# Patient Record
Sex: FEMALE | Race: BLACK OR AFRICAN AMERICAN | NOT HISPANIC OR LATINO | Employment: UNEMPLOYED | URBAN - METROPOLITAN AREA
[De-identification: names, ages, dates, MRNs, and addresses within clinical notes are randomized per-mention and may not be internally consistent; named-entity substitution may affect disease eponyms.]

---

## 2021-10-20 ENCOUNTER — OFFICE VISIT (OUTPATIENT)
Dept: INTERNAL MEDICINE CLINIC | Facility: CLINIC | Age: 31
End: 2021-10-20
Payer: COMMERCIAL

## 2021-10-20 VITALS
BODY MASS INDEX: 46.52 KG/M2 | OXYGEN SATURATION: 99 % | HEIGHT: 55 IN | HEART RATE: 91 BPM | TEMPERATURE: 98.9 F | SYSTOLIC BLOOD PRESSURE: 90 MMHG | WEIGHT: 201 LBS | DIASTOLIC BLOOD PRESSURE: 68 MMHG

## 2021-10-20 DIAGNOSIS — E34.3 DWARFISM: ICD-10-CM

## 2021-10-20 DIAGNOSIS — Z00.00 ANNUAL PHYSICAL EXAM: Primary | ICD-10-CM

## 2021-10-20 DIAGNOSIS — E66.01 MORBID OBESITY WITH BMI OF 50.0-59.9, ADULT (HCC): ICD-10-CM

## 2021-10-20 DIAGNOSIS — E11.9 TYPE 2 DIABETES MELLITUS WITHOUT COMPLICATION, WITHOUT LONG-TERM CURRENT USE OF INSULIN (HCC): ICD-10-CM

## 2021-10-20 DIAGNOSIS — J45.30 MILD PERSISTENT ASTHMA WITHOUT COMPLICATION: ICD-10-CM

## 2021-10-20 PROBLEM — E34.328 DWARFISM: Status: ACTIVE | Noted: 2021-10-20

## 2021-10-20 PROCEDURE — 99385 PREV VISIT NEW AGE 18-39: CPT | Performed by: FAMILY MEDICINE

## 2021-10-20 PROCEDURE — 3725F SCREEN DEPRESSION PERFORMED: CPT | Performed by: FAMILY MEDICINE

## 2021-10-20 PROCEDURE — 1036F TOBACCO NON-USER: CPT | Performed by: FAMILY MEDICINE

## 2021-10-20 PROCEDURE — 3008F BODY MASS INDEX DOCD: CPT | Performed by: FAMILY MEDICINE

## 2021-10-20 RX ORDER — LANCETS 30 GAUGE
EACH MISCELLANEOUS
COMMUNITY
Start: 2021-10-13

## 2021-10-20 RX ORDER — ERGOCALCIFEROL 1.25 MG/1
CAPSULE ORAL
COMMUNITY
Start: 2021-10-13

## 2021-10-20 RX ORDER — SEMAGLUTIDE 1.34 MG/ML
INJECTION, SOLUTION SUBCUTANEOUS
COMMUNITY
Start: 2021-10-13

## 2021-10-20 RX ORDER — BLOOD SUGAR DIAGNOSTIC
STRIP MISCELLANEOUS
COMMUNITY
Start: 2021-10-13

## 2021-10-20 RX ORDER — PSEUDOEPHEDRINE HCL 30 MG
100 TABLET ORAL 2 TIMES DAILY
COMMUNITY
Start: 2021-04-12 | End: 2022-04-12

## 2021-10-20 RX ORDER — BUPROPION HYDROCHLORIDE 150 MG/1
150 TABLET ORAL
COMMUNITY
Start: 2021-01-18 | End: 2022-01-18

## 2022-01-11 ENCOUNTER — TELEPHONE (OUTPATIENT)
Dept: INTERNAL MEDICINE CLINIC | Facility: CLINIC | Age: 32
End: 2022-01-11

## 2022-01-11 NOTE — TELEPHONE ENCOUNTER
Patient states her employer is requiring her to provide proof of being negative for COVID before returning to work  Patient was tested on 12/27/21 and states she received a positive result from [facility]  Patient wants to know if she would be able to get a letter stating that she can return to work because of CDC guidelines of being quarantined for 10 days       Advise patient [phone]

## 2022-03-09 ENCOUNTER — TELEPHONE (OUTPATIENT)
Dept: INTERNAL MEDICINE CLINIC | Facility: CLINIC | Age: 32
End: 2022-03-09

## 2023-07-19 ENCOUNTER — HOSPITAL ENCOUNTER (EMERGENCY)
Facility: HOSPITAL | Age: 33
Discharge: HOME | End: 2023-07-20
Attending: EMERGENCY MEDICINE
Payer: COMMERCIAL

## 2023-07-19 ENCOUNTER — APPOINTMENT (EMERGENCY)
Dept: CT IMAGING | Facility: HOSPITAL | Age: 33
End: 2023-07-19
Payer: COMMERCIAL

## 2023-07-19 DIAGNOSIS — E11.65 HYPERGLYCEMIA DUE TO DIABETES MELLITUS (HCC): ICD-10-CM

## 2023-07-19 DIAGNOSIS — S52.609A DISTAL END OF ULNA FRACTURE, CLOSED: ICD-10-CM

## 2023-07-19 DIAGNOSIS — S00.83XA CONTUSION OF FACE: ICD-10-CM

## 2023-07-19 DIAGNOSIS — S09.93XA FACIAL TRAUMA: ICD-10-CM
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
## 2023-07-19 PROCEDURE — 70486 CT MAXILLOFACIAL W/O DYE: CPT

## 2023-07-19 PROCEDURE — 71260 CT THORAX DX C+: CPT

## 2023-07-19 PROCEDURE — 70450 CT HEAD/BRAIN W/O DYE: CPT

## 2023-07-19 PROCEDURE — 99285 EMERGENCY DEPT VISIT HI MDM: CPT | Performed by: EMERGENCY MEDICINE

## 2023-07-19 PROCEDURE — 90471 IMMUNIZATION ADMIN: CPT

## 2023-07-19 PROCEDURE — 72125 CT NECK SPINE W/O DYE: CPT

## 2023-07-19 PROCEDURE — 74177 CT ABD & PELVIS W/CONTRAST: CPT

## 2023-07-20 ENCOUNTER — APPOINTMENT (EMERGENCY)
Dept: RADIOLOGY | Facility: HOSPITAL | Age: 33
End: 2023-07-20
Payer: COMMERCIAL

## 2023-07-20 VITALS
HEART RATE: 101 BPM | BODY MASS INDEX: 38.83 KG/M2 | RESPIRATION RATE: 15 BRPM | OXYGEN SATURATION: 100 % | SYSTOLIC BLOOD PRESSURE: 118 MMHG | WEIGHT: 180 LBS | DIASTOLIC BLOOD PRESSURE: 60 MMHG | HEIGHT: 57 IN | TEMPERATURE: 98.4 F

## 2023-07-20 LAB
ALBUMIN SERPL BCP-MCNC: 4.1 G/DL (ref 3.5–5)
ALP SERPL-CCNC: 104 U/L (ref 34–104)
ALT SERPL W P-5'-P-CCNC: 14 U/L (ref 7–52)
ANION GAP SERPL CALCULATED.3IONS-SCNC: 10 MMOL/L
AST SERPL W P-5'-P-CCNC: 15 U/L (ref 13–39)
ATRIAL RATE: 109 BPM
BASOPHILS # BLD AUTO: 0.04 THOUSANDS/ÂΜL (ref 0–0.1)
BASOPHILS NFR BLD AUTO: 0 % (ref 0–1)
BILIRUB SERPL-MCNC: 0.8 MG/DL (ref 0.2–1)
BUN SERPL-MCNC: 16 MG/DL (ref 5–25)
CALCIUM SERPL-MCNC: 9.4 MG/DL (ref 8.4–10.2)
CARDIAC TROPONIN I PNL SERPL HS: <2 NG/L
CHLORIDE SERPL-SCNC: 97 MMOL/L (ref 96–108)
CO2 SERPL-SCNC: 23 MMOL/L (ref 21–32)
CREAT SERPL-MCNC: 0.78 MG/DL (ref 0.6–1.3)
EOSINOPHIL # BLD AUTO: 0 THOUSAND/ÂΜL (ref 0–0.61)
EOSINOPHIL NFR BLD AUTO: 0 % (ref 0–6)
ERYTHROCYTE [DISTWIDTH] IN BLOOD BY AUTOMATED COUNT: 15 % (ref 11.6–15.1)
GFR SERPL CREATININE-BSD FRML MDRD: 99 ML/MIN/1.73SQ M
GLUCOSE SERPL-MCNC: 333 MG/DL (ref 65–140)
HCG SERPL QL: NEGATIVE
HCT VFR BLD AUTO: 42.7 % (ref 34.8–46.1)
HGB BLD-MCNC: 13.6 G/DL (ref 11.5–15.4)
IMM GRANULOCYTES # BLD AUTO: 0.04 THOUSAND/UL (ref 0–0.2)
IMM GRANULOCYTES NFR BLD AUTO: 0 % (ref 0–2)
LYMPHOCYTES # BLD AUTO: 2.15 THOUSANDS/ÂΜL (ref 0.6–4.47)
LYMPHOCYTES NFR BLD AUTO: 13 % (ref 14–44)
MCH RBC QN AUTO: 22.4 PG (ref 26.8–34.3)
MCHC RBC AUTO-ENTMCNC: 31.9 G/DL (ref 31.4–37.4)
MCV RBC AUTO: 70 FL (ref 82–98)
MONOCYTES # BLD AUTO: 0.83 THOUSAND/ÂΜL (ref 0.17–1.22)
MONOCYTES NFR BLD AUTO: 5 % (ref 4–12)
NEUTROPHILS # BLD AUTO: 13.34 THOUSANDS/ÂΜL (ref 1.85–7.62)
NEUTS SEG NFR BLD AUTO: 82 % (ref 43–75)
NRBC BLD AUTO-RTO: 0 /100 WBCS
P AXIS: 80 DEGREES
PLATELET # BLD AUTO: 386 THOUSANDS/UL (ref 149–390)
PMV BLD AUTO: 10.3 FL (ref 8.9–12.7)
POTASSIUM SERPL-SCNC: 3.9 MMOL/L (ref 3.5–5.3)
PR INTERVAL: 152 MS
PROT SERPL-MCNC: 7.6 G/DL (ref 6.4–8.4)
QRS AXIS: 82 DEGREES
QRSD INTERVAL: 70 MS
QT INTERVAL: 332 MS
QTC INTERVAL: 447 MS
RBC # BLD AUTO: 6.07 MILLION/UL (ref 3.81–5.12)
SODIUM SERPL-SCNC: 130 MMOL/L (ref 135–147)
T WAVE AXIS: 31 DEGREES
VENTRICULAR RATE: 109 BPM
WBC # BLD AUTO: 16.4 THOUSAND/UL (ref 4.31–10.16)

## 2023-07-20 PROCEDURE — 80053 COMPREHEN METABOLIC PANEL: CPT | Performed by: EMERGENCY MEDICINE

## 2023-07-20 PROCEDURE — 93010 ELECTROCARDIOGRAM REPORT: CPT | Performed by: INTERNAL MEDICINE

## 2023-07-20 PROCEDURE — 73090 X-RAY EXAM OF FOREARM: CPT

## 2023-07-20 PROCEDURE — 85025 COMPLETE CBC W/AUTO DIFF WBC: CPT | Performed by: EMERGENCY MEDICINE

## 2023-07-20 PROCEDURE — 73110 X-RAY EXAM OF WRIST: CPT

## 2023-07-20 PROCEDURE — 36415 COLL VENOUS BLD VENIPUNCTURE: CPT | Performed by: EMERGENCY MEDICINE

## 2023-07-20 PROCEDURE — 84484 ASSAY OF TROPONIN QUANT: CPT | Performed by: EMERGENCY MEDICINE

## 2023-07-20 PROCEDURE — 84703 CHORIONIC GONADOTROPIN ASSAY: CPT | Performed by: EMERGENCY MEDICINE

## 2023-07-20 PROCEDURE — 93005 ELECTROCARDIOGRAM TRACING: CPT

## 2023-07-20 PROCEDURE — 73564 X-RAY EXAM KNEE 4 OR MORE: CPT

## 2023-07-20 PROCEDURE — 90715 TDAP VACCINE 7 YRS/> IM: CPT | Performed by: EMERGENCY MEDICINE

## 2023-07-20 RX ORDER — GINSENG 100 MG
1 CAPSULE ORAL ONCE
Status: COMPLETED | OUTPATIENT
Start: 2023-07-20 | End: 2023-07-20

## 2023-07-20 RX ORDER — IBUPROFEN 400 MG/1
400 TABLET ORAL ONCE
Status: COMPLETED | OUTPATIENT
Start: 2023-07-20 | End: 2023-07-20

## 2023-07-20 RX ORDER — ACETAMINOPHEN 325 MG/1
650 TABLET ORAL ONCE
Status: COMPLETED | OUTPATIENT
Start: 2023-07-20 | End: 2023-07-20

## 2023-07-20 RX ADMIN — IOHEXOL 100 ML: 350 INJECTION, SOLUTION INTRAVENOUS at 01:15

## 2023-07-20 RX ADMIN — IBUPROFEN 400 MG: 400 TABLET, FILM COATED ORAL at 03:46

## 2023-07-20 RX ADMIN — BACITRACIN ZINC 1 LARGE APPLICATION: 500 OINTMENT TOPICAL at 03:46

## 2023-07-20 RX ADMIN — ACETAMINOPHEN 650 MG: 325 TABLET, FILM COATED ORAL at 03:46

## 2023-07-20 RX ADMIN — TETANUS TOXOID, REDUCED DIPHTHERIA TOXOID AND ACELLULAR PERTUSSIS VACCINE, ADSORBED 0.5 ML: 5; 2.5; 8; 8; 2.5 SUSPENSION INTRAMUSCULAR at 03:46
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
## 2023-07-23 DIAGNOSIS — M25.531 PAIN IN RIGHT WRIST: Primary | ICD-10-CM

## 2023-07-25 ENCOUNTER — APPOINTMENT (OUTPATIENT)
Dept: RADIOLOGY | Facility: CLINIC | Age: 33
End: 2023-07-25
Payer: COMMERCIAL

## 2023-07-25 ENCOUNTER — OFFICE VISIT (OUTPATIENT)
Dept: OBGYN CLINIC | Facility: CLINIC | Age: 33
End: 2023-07-25
Payer: COMMERCIAL

## 2023-07-25 VITALS
HEIGHT: 57 IN | DIASTOLIC BLOOD PRESSURE: 82 MMHG | BODY MASS INDEX: 38.83 KG/M2 | WEIGHT: 180 LBS | SYSTOLIC BLOOD PRESSURE: 122 MMHG | HEART RATE: 105 BPM

## 2023-07-25 DIAGNOSIS — S52.691A OTHER CLOSED FRACTURE OF DISTAL END OF RIGHT ULNA, INITIAL ENCOUNTER: Primary | ICD-10-CM

## 2023-07-25 DIAGNOSIS — M25.531 PAIN IN RIGHT WRIST: ICD-10-CM

## 2023-07-25 PROBLEM — S52.601A CLOSED FRACTURE OF LOWER END OF RIGHT ULNA: Status: ACTIVE | Noted: 2023-07-25

## 2023-07-25 PROCEDURE — 73110 X-RAY EXAM OF WRIST: CPT

## 2023-07-25 PROCEDURE — 25530 CLTX ULNAR SHFT FX W/O MNPJ: CPT | Performed by: ORTHOPAEDIC SURGERY

## 2023-07-25 PROCEDURE — 99203 OFFICE O/P NEW LOW 30 MIN: CPT | Performed by: ORTHOPAEDIC SURGERY

## 2023-07-25 NOTE — LETTER
July 25, 2023     Patient: Mitch Penaloza  YOB: 1990  Date of Visit: 7/25/2023      To Whom it May Concern:    Mitch Penaloza is under my professional care. Gudelia Stiles was seen in my office on 7/25/2023. Gudelia Stiles may return to work with limitation, no lifting greater then 5 lbs with right upper extremity. Cast must remain clean, dry and intact. She will be re-evaluated in 4 weeks time. If you have any questions or concerns, please don't hesitate to call.          Sincerely,          Derek Guerra MD
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
## 2023-08-19 DIAGNOSIS — S52.691A OTHER CLOSED FRACTURE OF DISTAL END OF RIGHT ULNA, INITIAL ENCOUNTER: Primary | ICD-10-CM

## 2023-08-22 ENCOUNTER — APPOINTMENT (OUTPATIENT)
Dept: RADIOLOGY | Facility: CLINIC | Age: 33
End: 2023-08-22
Payer: COMMERCIAL

## 2023-08-22 ENCOUNTER — OFFICE VISIT (OUTPATIENT)
Dept: OBGYN CLINIC | Facility: CLINIC | Age: 33
End: 2023-08-22

## 2023-08-22 DIAGNOSIS — S52.691D OTHER CLOSED FRACTURE OF DISTAL END OF RIGHT ULNA WITH ROUTINE HEALING, SUBSEQUENT ENCOUNTER: Primary | ICD-10-CM

## 2023-08-22 DIAGNOSIS — S52.691A OTHER CLOSED FRACTURE OF DISTAL END OF RIGHT ULNA, INITIAL ENCOUNTER: ICD-10-CM

## 2023-08-22 PROCEDURE — 73110 X-RAY EXAM OF WRIST: CPT

## 2023-08-22 PROCEDURE — 99024 POSTOP FOLLOW-UP VISIT: CPT | Performed by: ORTHOPAEDIC SURGERY

## 2023-08-22 NOTE — PROGRESS NOTES
Orthopaedics Office Visit - Established Patient Visit    ASSESSMENT/PLAN:    Assessment:   Right distal ulna fracture, DOI 7/19/23   Significant callus, minimal pain to palpation over the fracture site, mild stiffness/soreness however    Plan:   · The patient's x-rays were reviewed today. · The patient's cast was removed today. · The patient was provided a cock-up wrist splint to assist with transition out of cast.  · She was provided work restrictions including no lifting greater than 10 lbs. A note was provided. · The patient was referred to occupational therapy. · Follow-up in 3 weeks for re-evaluation     To Do Next Visit:  Re-evaluation     _____________________________________________________  CHIEF COMPLAINT:  Chief Complaint   Patient presents with   • Right Wrist - Follow-up         SUBJECTIVE:  Soco Hargrove is a 28 y.o. female who presents approximately 5 weeks status post Right distal ulna fracture sustained on 7/19/23. She has maintained her cast. She notes there is pain following cast removal at the midshaft of the ulna. She has been doing curbside deliveries with limited hours. PAST MEDICAL HISTORY:  Past Medical History:   Diagnosis Date   • Diabetes mellitus ([address])        PAST SURGICAL HISTORY:  History reviewed. No pertinent surgical history. FAMILY HISTORY:  History reviewed. No pertinent family history. SOCIAL HISTORY:  Social History     Tobacco Use   • Smoking status: Never   • Smokeless tobacco: Never   Vaping Use   • Vaping Use: Never used   Substance Use Topics   • Alcohol use: Never   • Drug use: Never       MEDICATIONS:    Current Outpatient Medications:   •  ergocalciferol (VITAMIN D2) 50,000 units, , Disp: , Rfl:   •  fluticasone-salmeterol (Advair) 250-50 mcg/dose inhaler, , Disp: , Rfl:   •  glucose blood test strip, To be used for blood sugar checks bid.  Dx E11.65, Disp: , Rfl:   •  Lancets (OneTouch Delica Plus [id number]) MISC, , Disp: , Rfl:   •  Jeannie Mate Lancets 30G MISC, To be used for blood sugar checks bid. Dx E11.65, Disp: , Rfl:   •  OneTouch Verio test strip, , Disp: , Rfl:   •  Ozempic, 0.25 or 0.5 MG/DOSE, 2 MG/1.5ML SOPN, , Disp: , Rfl:   •  buPROPion (WELLBUTRIN XL) 150 mg 24 hr tablet, Take 150 mg by mouth, Disp: , Rfl:   •  Docusate Sodium (DSS) 100 MG CAPS, Take 100 mg by mouth 2 (two) times a day, Disp: , Rfl:   •  hydrocortisone 2.5 % cream, Apply topically 2 (two) times a day, Disp: , Rfl:   •  metFORMIN (GLUCOPHAGE) 1000 MG tablet, Take 1,000 mg by mouth, Disp: , Rfl:     ALLERGIES:  No Known Allergies    REVIEW OF SYSTEMS:  MSK: right wrist pain  Neuro: WNL  Pertinent items are otherwise noted in HPI. A comprehensive review of systems was otherwise negative. LABS:  HgA1c:   Lab Results   Component Value Date    HGBA1C 14.4 (H) 04/11/2023     BMP:   Lab Results   Component Value Date    GLUCOSE 95 01/04/2014    CALCIUM 9.4 07/20/2023     (L) 01/04/2014    K 3.9 07/20/2023    CO2 23 07/20/2023    CL 97 07/20/2023    BUN 16 07/20/2023    CREATININE 0.78 07/20/2023     CBC: No components found for: "CBC"    _____________________________________________________  PHYSICAL EXAMINATION:  Vital signs: There were no vitals taken for this visit. General: No acute distress, awake and alert  Psychiatric: Mood and affect appear appropriate  HEENT: Trachea Midline, No torticollis, no apparent facial trauma  Cardiovascular: No audible murmurs;  Extremities appear perfused  Pulmonary: No audible wheezing or stridor  Skin: No open lesions; see further details (if any) below    MUSCULOSKELETAL EXAMINATION:  Extremities:      Right Wrist  Active range of motion maintained  Tenderness to palpation over midshaft of ulna   strength 5/5  Normal motion of fingers  Swelling noted over midshaft of ulna  No ecchymosis   Fingers are pink and mobile  Compartments are soft  Brisk capillary refill  Sensation is intact   The patient is neurovascularly intact distally in the extremity. _____________________________________________________  STUDIES REVIEWED:  I personally reviewed the images and interpretation is as follows:    X-rays taken 8/22/23 of right wrist demonstrate maintenance of alignment of mid shaft of ulna fracture with interval healing and callus formation. No acute fracture. PROCEDURES PERFORMED:  Procedures   None performed.        Scribe Attestation    I,:  Adrian Coreas am acting as a scribe while in the presence of the attending physician.:       I,:  Nora Elizondo MD personally performed the services described in this documentation    as scribed in my presence.:

## 2023-08-28 ENCOUNTER — TELEPHONE (OUTPATIENT)
Age: 33
End: 2023-08-28

## 2023-08-28 NOTE — TELEPHONE ENCOUNTER
Scheduled appointment for patient with Dr. Bulmaro Ray for Thursday, 09/07/23 at 10.10 am. Patient will do IRIS eye exam at the times of the appointment.

## 2023-09-07 ENCOUNTER — OFFICE VISIT (OUTPATIENT)
Age: 33
End: 2023-09-07
Payer: COMMERCIAL

## 2023-09-07 ENCOUNTER — TELEPHONE (OUTPATIENT)
Age: 33
End: 2023-09-07

## 2023-09-07 VITALS
HEART RATE: 90 BPM | OXYGEN SATURATION: 98 % | BODY MASS INDEX: 38.95 KG/M2 | TEMPERATURE: 97.8 F | RESPIRATION RATE: 18 BRPM | WEIGHT: 180 LBS | DIASTOLIC BLOOD PRESSURE: 80 MMHG | SYSTOLIC BLOOD PRESSURE: 100 MMHG

## 2023-09-07 DIAGNOSIS — F32.2 SEVERE MAJOR DEPRESSION, SINGLE EPISODE (HCC): ICD-10-CM

## 2023-09-07 DIAGNOSIS — J45.30 MILD PERSISTENT ASTHMA WITHOUT COMPLICATION: ICD-10-CM

## 2023-09-07 DIAGNOSIS — Z11.59 NEED FOR HEPATITIS C SCREENING TEST: ICD-10-CM

## 2023-09-07 DIAGNOSIS — Z59.01 LIVING IN SHELTER: ICD-10-CM

## 2023-09-07 DIAGNOSIS — E11.9 TYPE 2 DIABETES MELLITUS WITHOUT COMPLICATION, WITHOUT LONG-TERM CURRENT USE OF INSULIN (HCC): ICD-10-CM

## 2023-09-07 DIAGNOSIS — Z00.00 ANNUAL PHYSICAL EXAM: Primary | ICD-10-CM

## 2023-09-07 DIAGNOSIS — Z87.898 HISTORY OF DOMESTIC VIOLENCE: ICD-10-CM

## 2023-09-07 DIAGNOSIS — Z11.4 SCREENING FOR HIV (HUMAN IMMUNODEFICIENCY VIRUS): ICD-10-CM

## 2023-09-07 PROBLEM — E66.01 MORBID OBESITY WITH BMI OF 50.0-59.9, ADULT (HCC): Status: RESOLVED | Noted: 2021-10-20 | Resolved: 2023-09-07

## 2023-09-07 LAB — SL AMB POCT HEMOGLOBIN AIC: 12.6 (ref ?–6.5)

## 2023-09-07 PROCEDURE — 83036 HEMOGLOBIN GLYCOSYLATED A1C: CPT | Performed by: FAMILY MEDICINE

## 2023-09-07 PROCEDURE — 92250 FUNDUS PHOTOGRAPHY W/I&R: CPT | Performed by: FAMILY MEDICINE

## 2023-09-07 PROCEDURE — 99214 OFFICE O/P EST MOD 30 MIN: CPT | Performed by: FAMILY MEDICINE

## 2023-09-07 PROCEDURE — 99395 PREV VISIT EST AGE 18-39: CPT | Performed by: FAMILY MEDICINE

## 2023-09-07 RX ORDER — LANCETS 30 GAUGE
EACH MISCELLANEOUS
Qty: 100 EACH | Refills: 2 | Status: SHIPPED | OUTPATIENT
Start: 2023-09-07

## 2023-09-07 RX ORDER — SEMAGLUTIDE 1.34 MG/ML
0.5 INJECTION, SOLUTION SUBCUTANEOUS
Qty: 1.52 ML | Refills: 0 | Status: SHIPPED | OUTPATIENT
Start: 2023-09-07 | End: 2023-10-07

## 2023-09-07 RX ORDER — ALBUTEROL SULFATE 90 UG/1
2 AEROSOL, METERED RESPIRATORY (INHALATION) EVERY 6 HOURS PRN
Qty: 18 G | Refills: 5 | Status: SHIPPED | OUTPATIENT
Start: 2023-09-07

## 2023-09-07 RX ORDER — FLUTICASONE PROPIONATE AND SALMETEROL 250; 50 UG/1; UG/1
1 POWDER RESPIRATORY (INHALATION) 2 TIMES DAILY
Qty: 60 BLISTER | Refills: 5 | Status: SHIPPED | OUTPATIENT
Start: 2023-09-07

## 2023-09-07 RX ORDER — LANCETS 30 GAUGE
EACH MISCELLANEOUS
Qty: 100 EACH | Refills: 0 | Status: SHIPPED | OUTPATIENT
Start: 2023-09-07

## 2023-09-07 SDOH — ECONOMIC STABILITY - HOUSING INSECURITY: SHELTERED HOMELESSNESS: Z59.01
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
## 2023-09-07 NOTE — TELEPHONE ENCOUNTER
Scheduled pt for 4 wk f/u with Dr Goldie Devi on: 10/9 at [facility]- long visit- under [id number] [id number]    Maybe it should've been a short visit; since it's a f/u    Let me know if I have to change it- I can call the pt tolerated

## 2023-09-08 ENCOUNTER — TELEPHONE (OUTPATIENT)
Age: 33
End: 2023-09-08

## 2023-09-08 DIAGNOSIS — S52.691D OTHER CLOSED FRACTURE OF DISTAL END OF RIGHT ULNA WITH ROUTINE HEALING, SUBSEQUENT ENCOUNTER: Primary | ICD-10-CM

## 2023-09-08 LAB
CREAT ?TM UR-SCNC: 101.9 UMOL/L
EXT ALBUMIN URINE RANDOM: 2.1
EXTERNAL HIV SCREEN: NORMAL
HCV AB SER-ACNC: NORMAL
LEFT EYE DIABETIC RETINOPATHY: NORMAL
LEFT EYE IMAGE QUALITY: NORMAL
LEFT EYE MACULAR EDEMA: NORMAL
LEFT EYE OTHER RETINOPATHY: NORMAL
MICROALBUMIN/CREAT UR: 20.6 MG/G{CREAT}
RIGHT EYE DIABETIC RETINOPATHY: NORMAL
RIGHT EYE IMAGE QUALITY: NORMAL
RIGHT EYE MACULAR EDEMA: NORMAL
RIGHT EYE OTHER RETINOPATHY: NORMAL
SEVERITY (EYE EXAM): NORMAL

## 2023-09-08 NOTE — TELEPHONE ENCOUNTER
----- Message from Carmelo Wagoner DO sent at 9/8/2023  9:12 AM EDT -----  Normal diabetes eye screening

## 2023-09-12 ENCOUNTER — TELEPHONE (OUTPATIENT)
Age: 33
End: 2023-09-12

## 2023-09-12 ENCOUNTER — OFFICE VISIT (OUTPATIENT)
Dept: OBGYN CLINIC | Facility: CLINIC | Age: 33
End: 2023-09-12

## 2023-09-12 ENCOUNTER — APPOINTMENT (OUTPATIENT)
Dept: RADIOLOGY | Facility: CLINIC | Age: 33
End: 2023-09-12
Payer: COMMERCIAL

## 2023-09-12 VITALS
SYSTOLIC BLOOD PRESSURE: 125 MMHG | BODY MASS INDEX: 38.83 KG/M2 | HEIGHT: 57 IN | HEART RATE: 72 BPM | DIASTOLIC BLOOD PRESSURE: 85 MMHG | WEIGHT: 180 LBS

## 2023-09-12 DIAGNOSIS — S52.691D OTHER CLOSED FRACTURE OF DISTAL END OF RIGHT ULNA WITH ROUTINE HEALING, SUBSEQUENT ENCOUNTER: Primary | ICD-10-CM

## 2023-09-12 DIAGNOSIS — S52.691D OTHER CLOSED FRACTURE OF DISTAL END OF RIGHT ULNA WITH ROUTINE HEALING, SUBSEQUENT ENCOUNTER: ICD-10-CM

## 2023-09-12 PROCEDURE — 73110 X-RAY EXAM OF WRIST: CPT

## 2023-09-12 PROCEDURE — 99024 POSTOP FOLLOW-UP VISIT: CPT | Performed by: ORTHOPAEDIC SURGERY

## 2023-09-12 RX ORDER — NAPROXEN 500 MG/1
500 TABLET ORAL 2 TIMES DAILY WITH MEALS
Qty: 30 TABLET | Refills: 0 | Status: SHIPPED | OUTPATIENT
Start: 2023-09-12

## 2023-09-12 NOTE — PATIENT INSTRUCTIONS
- Weight bearing as tolerated Right upper extremity   - Continue to recommend occupational therapy for the right upper extremity   - Over the counter analgesics as needed / directed   - Ice / heat as directed   - Follow up 4 weeks with repeat XR

## 2023-09-12 NOTE — TELEPHONE ENCOUNTER
----- Message from Juni Kaplan DO sent at 9/12/2023  3:58 PM EDT -----  HIV, hepatitis C and urine study was normal

## 2023-09-12 NOTE — PROGRESS NOTES
Orthopaedics Office Visit - Follow up Patient Visit    ASSESSMENT/PLAN:    Assessment:   ***    Plan:   ***    To Do Next Visit:  ***    _____________________________________________________  CHIEF COMPLAINT:  Chief Complaint   Patient presents with   • Right Wrist - Follow-up         SUBJECTIVE:  Humberto Lamb is a 28 y.o. female who presents ***     PAST MEDICAL HISTORY:  Past Medical History:   Diagnosis Date   • Diabetes mellitus ([address])        PAST SURGICAL HISTORY:  History reviewed. No pertinent surgical history. FAMILY HISTORY:  History reviewed. No pertinent family history.     SOCIAL HISTORY:  Social History     Tobacco Use   • Smoking status: Never   • Smokeless tobacco: Never   Vaping Use   • Vaping Use: Never used   Substance Use Topics   • Alcohol use: Never   • Drug use: Never       MEDICATIONS:    Current Outpatient Medications:   •  albuterol (Ventolin HFA) 90 mcg/act inhaler, Inhale 2 puffs every 6 (six) hours as needed for wheezing, Disp: 18 g, Rfl: 5  •  Fluticasone-Salmeterol (Advair Diskus) 250-50 mcg/dose inhaler, Inhale 1 puff 2 (two) times a day, Disp: 60 blister, Rfl: 5  •  glucose blood test strip, Use 1 each 2 (two) times a day Use as instructed, Disp: 100 strip, Rfl: 2  •  Lancets (OneTouch Delica Plus [id number]) MISC, Use to check blood sugar 3 times a day, Disp: 100 each, Rfl: 0  •  metFORMIN (GLUCOPHAGE) 1000 MG tablet, Take 1 tablet (1,000 mg total) by mouth 2 (two) times a day with meals, Disp: 180 tablet, Rfl: 1  •  OneTouch Delica Lancets 76V MISC, Use to check blood sugar 3 times a day, Disp: 100 each, Rfl: 2  •  Ozempic, 0.25 or 0.5 MG/DOSE, 2 MG/1.5ML injection pen, Inject 0.38 mL (0.5 mg total) under the skin every 7 days, Disp: 1.52 mL, Rfl: 0  •  buPROPion (WELLBUTRIN XL) 150 mg 24 hr tablet, Take 150 mg by mouth, Disp: , Rfl:   •  Docusate Sodium (DSS) 100 MG CAPS, Take 100 mg by mouth 2 (two) times a day, Disp: , Rfl:   •  ergocalciferol (VITAMIN D2) 50,000 units, , Disp: , Rfl:   •  hydrocortisone 2.5 % cream, Apply topically 2 (two) times a day, Disp: , Rfl:   •  OneTouch Verio test strip, , Disp: , Rfl:     ALLERGIES:  No Known Allergies    REVIEW OF SYSTEMS:  MSK: ***  Neuro: ***  Pertinent items are otherwise noted in HPI. A comprehensive review of systems was otherwise negative. LABS:  HgA1c:   Lab Results   Component Value Date    HGBA1C 12.6 (A) 09/07/2023     BMP:   Lab Results   Component Value Date    GLUCOSE 95 01/04/2014    CALCIUM 9.4 07/20/2023     (L) 01/04/2014    K 3.9 07/20/2023    CO2 23 07/20/2023    CL 97 07/20/2023    BUN 16 07/20/2023    CREATININE 0.78 07/20/2023     CBC: No components found for: "CBC"    _____________________________________________________  PHYSICAL EXAMINATION:  Vital signs: /85   Pulse 72   Ht 4' 9" (1.448 m)   Wt 81.6 kg (180 lb)   BMI 38.95 kg/m²   General: No acute distress, awake and alert  Psychiatric: Mood and affect appear appropriate  HEENT: Trachea Midline, No torticollis, no apparent facial trauma  Cardiovascular: No audible murmurs;  Extremities appear perfused  Pulmonary: No audible wheezing or stridor  Skin: No open lesions; see further details (if any) below    MUSCULOSKELETAL EXAMINATION:  Extremities:  ***      _____________________________________________________  STUDIES REVIEWED:  I personally reviewed the images and interpretation is as follows:      PROCEDURES PERFORMED:  Mitul Su

## 2023-09-12 NOTE — PROGRESS NOTES
Orthopaedics Office Visit - Follow up Patient Visit    ASSESSMENT/PLAN:    Assessment:   7 weeks s/p injury resulting in a Right distal ulna fracture, DOI 7/19/23. Mild ulnar sided pain currently   Working light duty without significant difficulty      Plan:   - Weight bearing as tolerated Right upper extremity   - Continue to recommend occupational therapy for the right upper extremity   - Over the counter analgesics as needed / directed   - Ice / heat as directed   - Follow up 4 weeks with repeat XR       To Do Next Visit:  XR right wrist     _____________________________________________________  CHIEF COMPLAINT:  Chief Complaint   Patient presents with   • Right Wrist - Follow-up         SUBJECTIVE:  Soco Hargrove is a 28 y.o. female who presents to the office for a follow up for her right forearm. Patient is 7 weeks s/p injury resulting in a Right distal ulna fracture, DOI 7/19/23. Patient states that her wrist is doing slightly better overall. Patient states that she does continue to pain over the fracture site but states it is minor in nature. Patient states that she does have pain associated with washing her face. Patient denies any new or worsening symptoms in the wrist.  Patient states that she is not currently taking any type of pain medication for her wrist.  Patient offers no other complaints at this time. PAST MEDICAL HISTORY:  Past Medical History:   Diagnosis Date   • Diabetes mellitus ([address])        PAST SURGICAL HISTORY:  History reviewed. No pertinent surgical history. FAMILY HISTORY:  History reviewed. No pertinent family history.     SOCIAL HISTORY:  Social History     Tobacco Use   • Smoking status: Never   • Smokeless tobacco: Never   Vaping Use   • Vaping Use: Never used   Substance Use Topics   • Alcohol use: Never   • Drug use: Never       MEDICATIONS:    Current Outpatient Medications:   •  albuterol (Ventolin HFA) 90 mcg/act inhaler, Inhale 2 puffs every 6 (six) hours as needed for wheezing, Disp: 18 g, Rfl: 5  •  Fluticasone-Salmeterol (Advair Diskus) 250-50 mcg/dose inhaler, Inhale 1 puff 2 (two) times a day, Disp: 60 blister, Rfl: 5  •  glucose blood test strip, Use 1 each 2 (two) times a day Use as instructed, Disp: 100 strip, Rfl: 2  •  Lancets (OneTouch Delica Plus [id number]) MISC, Use to check blood sugar 3 times a day, Disp: 100 each, Rfl: 0  •  metFORMIN (GLUCOPHAGE) 1000 MG tablet, Take 1 tablet (1,000 mg total) by mouth 2 (two) times a day with meals, Disp: 180 tablet, Rfl: 1  •  OneTouch Delica Lancets 02S MISC, Use to check blood sugar 3 times a day, Disp: 100 each, Rfl: 2  •  Ozempic, 0.25 or 0.5 MG/DOSE, 2 MG/1.5ML injection pen, Inject 0.38 mL (0.5 mg total) under the skin every 7 days, Disp: 1.52 mL, Rfl: 0  •  buPROPion (WELLBUTRIN XL) 150 mg 24 hr tablet, Take 150 mg by mouth, Disp: , Rfl:   •  Docusate Sodium (DSS) 100 MG CAPS, Take 100 mg by mouth 2 (two) times a day, Disp: , Rfl:   •  ergocalciferol (VITAMIN D2) 50,000 units, , Disp: , Rfl:   •  hydrocortisone 2.5 % cream, Apply topically 2 (two) times a day, Disp: , Rfl:   •  OneTouch Verio test strip, , Disp: , Rfl:     ALLERGIES:  No Known Allergies    REVIEW OF SYSTEMS:  MSK: right wrist pain   Neuro: WNL   Pertinent items are otherwise noted in HPI. A comprehensive review of systems was otherwise negative.     LABS:  HgA1c:   Lab Results   Component Value Date    HGBA1C 12.6 (A) 09/07/2023     BMP:   Lab Results   Component Value Date    GLUCOSE 95 01/04/2014    CALCIUM 9.4 07/20/2023     (L) 01/04/2014    K 3.9 07/20/2023    CO2 23 07/20/2023    CL 97 07/20/2023    BUN 16 07/20/2023    CREATININE 0.78 07/20/2023     CBC: No components found for: "CBC"    _____________________________________________________  PHYSICAL EXAMINATION:  Vital signs: /85   Pulse 72   Ht 4' 9" (1.448 m)   Wt 81.6 kg (180 lb)   BMI 38.95 kg/m²   General: No acute distress, awake and alert  Psychiatric: Mood and affect appear appropriate  HEENT: Trachea Midline, No torticollis, no apparent facial trauma  Cardiovascular: No audible murmurs; Extremities appear perfused  Pulmonary: No audible wheezing or stridor  Skin: No open lesions; see further details (if any) below      MUSCULOSKELETAL EXAMINATION:  Right wrist examination:  - Patient sitting comfortably in the office in no apparent distress   -Prominent protuberance present over the lateral aspect of the distal ulna. No other acute physical abnormalities present. -Mild tenderness palpation noted over the distal ulna. No other bony or soft tissue tenderness palpation noted at this time.  - NV intact    _____________________________________________________  STUDIES REVIEWED:  I personally reviewed the images and interpretation is as follows:  Right wrist x-ray 3 views:   Healing Distal ulna fracture acceptable alignment with abundant callus formation present      PROCEDURES PERFORMED:  No procedures were performed at this time. Taty Marcus PA-C - assisting  Elna Mortimer, MD                            Portions of the record may have been created with voice recognition software. Occasional wrong word or "sound a like" substitutions may have occurred due to the inherent limitations of voice recognition software. Read the chart carefully and recognize, using context, where substitutions have occurred.

## 2023-09-12 NOTE — LETTER
September 12, 2023     Patient: Katy Gordon  YOB: 1990  Date of Visit: 9/12/2023      To Whom it May Concern:    Katy Gordon is under my professional care. Sandro Jackson was seen in my office on 9/12/2023. Sandro Jackson may return to work with the following restrictions: no lifting greater than 10 lbs. Please allow her to wear wrist splint as needed. Follow-up in approximately 4 weeks for re-evaluation. If you have any questions or concerns, please don't hesitate to call.          Sincerely,          Mikey Okeefe MD

## 2023-09-13 RX ORDER — BLOOD-GLUCOSE CONTROL, NORMAL
EACH MISCELLANEOUS
Refills: 2 | OUTPATIENT
Start: 2023-09-13

## 2023-10-07 DIAGNOSIS — S52.691D OTHER CLOSED FRACTURE OF DISTAL END OF RIGHT ULNA WITH ROUTINE HEALING, SUBSEQUENT ENCOUNTER: Primary | ICD-10-CM

## 2024-06-06 ENCOUNTER — VBI (OUTPATIENT)
Dept: ADMINISTRATIVE | Facility: OTHER | Age: 34
End: 2024-06-06

## 2024-07-18 ENCOUNTER — VBI (OUTPATIENT)
Dept: ADMINISTRATIVE | Facility: OTHER | Age: 34
End: 2024-07-18

## 2024-07-18 NOTE — TELEPHONE ENCOUNTER
07/18/24 11:13 AM     Chart reviewed for Diabetic Eye Exam was/were not submitted to the patient's insurance.     Daina Acosta MA   [facility]

## 2024-09-17 ENCOUNTER — VBI (OUTPATIENT)
Dept: ADMINISTRATIVE | Facility: OTHER | Age: 34
End: 2024-09-17

## 2024-09-17 NOTE — TELEPHONE ENCOUNTER
09/17/24 9:24 AM     Chart reviewed for Diabetic Eye Exam was/were not submitted to the patient's insurance.     Daina Acosta MA   [facility]

## 2024-09-24 ENCOUNTER — OFFICE VISIT (OUTPATIENT)
Age: 34
End: 2024-09-24
Payer: COMMERCIAL

## 2024-09-24 ENCOUNTER — TELEPHONE (OUTPATIENT)
Age: 34
End: 2024-09-24

## 2024-09-24 VITALS
HEART RATE: 85 BPM | DIASTOLIC BLOOD PRESSURE: 68 MMHG | OXYGEN SATURATION: 98 % | SYSTOLIC BLOOD PRESSURE: 92 MMHG | BODY MASS INDEX: 35.43 KG/M2 | WEIGHT: 168.8 LBS | TEMPERATURE: 97 F | HEIGHT: 58 IN

## 2024-09-24 DIAGNOSIS — E11.9 TYPE 2 DIABETES MELLITUS WITHOUT COMPLICATION, WITHOUT LONG-TERM CURRENT USE OF INSULIN (HCC): ICD-10-CM

## 2024-09-24 DIAGNOSIS — J45.30 MILD PERSISTENT ASTHMA WITHOUT COMPLICATION: ICD-10-CM

## 2024-09-24 DIAGNOSIS — Z00.00 ANNUAL PHYSICAL EXAM: Primary | ICD-10-CM

## 2024-09-24 LAB
LEFT EYE DIABETIC RETINOPATHY: NORMAL
LEFT EYE IMAGE QUALITY: NORMAL
LEFT EYE MACULAR EDEMA: NORMAL
LEFT EYE OTHER RETINOPATHY: NORMAL
RIGHT EYE DIABETIC RETINOPATHY: NORMAL
RIGHT EYE IMAGE QUALITY: NORMAL
RIGHT EYE MACULAR EDEMA: NORMAL
RIGHT EYE OTHER RETINOPATHY: NORMAL
SEVERITY (EYE EXAM): NORMAL

## 2024-09-24 PROCEDURE — 99395 PREV VISIT EST AGE 18-39: CPT

## 2024-09-24 PROCEDURE — 92250 FUNDUS PHOTOGRAPHY W/I&R: CPT

## 2024-09-24 PROCEDURE — 99214 OFFICE O/P EST MOD 30 MIN: CPT

## 2024-09-24 NOTE — PROGRESS NOTES
Adult Annual Physical  Name: Veronica Prescott      : 1990      MRN: [id number]  Encounter Provider: Slava Winters PA-C  Encounter Date: 2024   Encounter department: [facility] [place]    Assessment & Plan  Annual physical exam  - Medical history reviewed, including existing medical conditions, medications, and surgeries.   - Labs discussed to evaluate cholesterol, blood sugar, kidney function, liver function, and other important markers of health.  - BMI evaluated and discussed.  - Cancer screenings discussed and recommended.  - Vaccination status reviewed and pertinent immunizations and booster shots discussed.  - Bone health reviewed.   - Skin examination.  Discussed importance of sunscreen and other preventative measures for skin cancer. Reviewed abcds melanoma and scc,bcc,ak characteristics to look for  - Lifestyle and health counseling completed including diet, exercise habits, smoking status, alcohol consumption.   - Mental health and wellbeing evaluated and discussed.  - Family history obtained to identify any of hereditary health risks.  All screenings and vaccinations have been reviewed, with benefits and risks discussed. All questions were answered. Appropriate orders have been placed as patient agrees to        Type 2 diabetes mellitus without complication, without long-term current use of insulin (HCC)  Patient has been noncompliant with metformin- States it causes GI upset and hasn't been taking any medications for diabetes  Stressed importance of good DM control in preventing health issues   Patient interested in glp1s for both weight loss and glucose control.   If not covered or availability issue, patient would likely need to go on insulin given most recent A1C  Advised to update labs before starting   Foot and eye exam completed today  Lab Results   Component Value Date    HGBA1C 12.6 (A) 2023       Orders:    IRIS Diabetic eye exam    Diabetic foot exam;  Future    CBC and differential; Future    Comprehensive metabolic panel; Future    Hemoglobin A1C; Future    Lipid panel; Future    Albumin / creatinine urine ratio; Future    semaglutide, 0.25 or 0.5 mg/dose, (Ozempic, 0.25 or 0.5 MG/DOSE,) 2 mg/3 mL injection pen; 0.25 mg under the skin every 7 days for 4 doses (28 days), THEN 0.5 mg under the skin every 7 days    Mild persistent asthma without complication  Controlled, continue current inhaler prn and avoid triggers      - Patient was counseled regarding: instructions for management, risk factor reductions, prognosis, patient and family education, risks and benefits of treatment options and importance of compliance with treatment  - Medication Side Effects: Adverse side effects of medications were reviewed with the patient/guardian today.    Immunizations and preventive care screenings were discussed with patient today. Appropriate education was printed on patient's after visit summary.    Counseling:z  Alcohol/drug use: discussed moderation in alcohol intake, the recommendations for healthy alcohol use, and avoidance of illicit drug use.  Dental Health: discussed importance of regular tooth brushing, flossing, and dental visits.  Injury prevention: discussed safety/seat belts, safety helmets, smoke detectors, carbon dioxide detectors, and smoking near bedding or upholstery.  Sexual health: discussed sexually transmitted diseases, partner selection, use of condoms, avoidance of unintended pregnancy, and contraceptive alternatives.  Exercise: the importance of regular exercise/physical activity was discussed. Recommend exercise 3-5 times per week for at least 30 minutes.       Depression Screening and Follow-up Plan: Patient was screened for depression during today's encounter. They screened negative with a PHQ-2 score of 0.        History of Present Illness     Adult Annual Physical:  Patient presents for annual physical. Patient presents today for annual  physical, Dr Ferguson patient.  Hasn't been taking metformin due to side effects   Declines statin and ace/arb medications .     Diet and Physical Activity:  - Diet/Nutrition: poor diet.  - Exercise: walking.    Depression Screening:  - PHQ-2 Score: 0    General Health:  - Sleep: sleeps well.  - Hearing: normal hearing right ear and normal hearing left ear.  - Vision: no vision problems.  - Dental: brushes teeth twice daily.    /GYN Health:  - Follows with GYN: yes.   - History of STDs: no    Advanced Care Planning:  - Has an advanced directive?: no    - Has a durable medical POA?: no    - ACP document given to patient?: no      Review of Systems   Constitutional:  Negative for activity change, diaphoresis, fatigue and fever.   HENT: Negative.  Negative for congestion and ear pain.    Eyes: Negative.    Respiratory: Negative.  Negative for cough, chest tightness and shortness of breath.    Cardiovascular:  Negative for chest pain, palpitations and leg swelling.   Gastrointestinal: Negative.    Endocrine: Negative for polydipsia, polyphagia and polyuria.   Genitourinary: Negative.  Negative for dysuria, flank pain, frequency, hematuria and urgency.   Musculoskeletal:  Negative for back pain, joint swelling, neck pain and neck stiffness.   Skin: Negative.    Neurological: Negative.  Negative for dizziness, weakness, light-headedness and headaches.   Hematological:  Negative for adenopathy.   Psychiatric/Behavioral: Negative.  Negative for confusion and sleep disturbance. The patient is not nervous/anxious.      Medical History Reviewed by provider this encounter:       Past Medical History   Past Medical History:   Diagnosis Date    Diabetes mellitus (HCC)      History reviewed. No pertinent surgical history.  History reviewed. No pertinent family history.  Current Outpatient Medications on File Prior to Visit   Medication Sig Dispense Refill    albuterol (Ventolin HFA) 90 mcg/act inhaler Inhale 2 puffs every 6 (six)  hours as needed for wheezing 18 g 5    Fluticasone-Salmeterol (Advair Diskus) 250-50 mcg/dose inhaler Inhale 1 puff 2 (two) times a day 60 blister 5    glucose blood test strip Use 1 each 2 (two) times a day Use as instructed 100 strip 2    hydrocortisone 2.5 % cream Apply topically 2 (two) times a day      Lancets (OneTouch Delica Plus [id number]) MISC Use to check blood sugar 3 times a day 100 each 0    OneTouch Delica Lancets 30G MISC Use to check blood sugar 3 times a day 100 each 2    OneTouch Verio test strip       Levonorgestrel (MIRENA) 20 MCG/DAY IUD 1 each by Intrauterine route      [DISCONTINUED] buPROPion (WELLBUTRIN XL) 150 mg 24 hr tablet Take 150 mg by mouth (Patient not taking: Reported on 9/24/2024)      [DISCONTINUED] Docusate Sodium (DSS) 100 MG CAPS Take 100 mg by mouth 2 (two) times a day (Patient not taking: Reported on 9/24/2024)      [DISCONTINUED] ergocalciferol (VITAMIN D2) 50,000 units  (Patient not taking: Reported on 9/7/2023)      [DISCONTINUED] metFORMIN (GLUCOPHAGE) 1000 MG tablet Take 1 tablet (1,000 mg total) by mouth 2 (two) times a day with meals (Patient not taking: Reported on 9/24/2024) 180 tablet 1    [DISCONTINUED] naproxen (EC NAPROSYN) 500 MG EC tablet Take 1 tablet (500 mg total) by mouth 2 (two) times a day with meals (Patient not taking: Reported on 9/24/2024) 30 tablet 0     No current facility-administered medications on file prior to visit.   No Known Allergies   Current Outpatient Medications on File Prior to Visit   Medication Sig Dispense Refill    albuterol (Ventolin HFA) 90 mcg/act inhaler Inhale 2 puffs every 6 (six) hours as needed for wheezing 18 g 5    Fluticasone-Salmeterol (Advair Diskus) 250-50 mcg/dose inhaler Inhale 1 puff 2 (two) times a day 60 blister 5    glucose blood test strip Use 1 each 2 (two) times a day Use as instructed 100 strip 2    hydrocortisone 2.5 % cream Apply topically 2 (two) times a day      Lancets (OneTouch Delica Plus [id number])  "MISC Use to check blood sugar 3 times a day 100 each 0    OneTouch Delica Lancets 30G MISC Use to check blood sugar 3 times a day 100 each 2    OneTouch Verio test strip       Levonorgestrel (MIRENA) 20 MCG/DAY IUD 1 each by Intrauterine route      [DISCONTINUED] buPROPion (WELLBUTRIN XL) 150 mg 24 hr tablet Take 150 mg by mouth (Patient not taking: Reported on 9/24/2024)      [DISCONTINUED] Docusate Sodium (DSS) 100 MG CAPS Take 100 mg by mouth 2 (two) times a day (Patient not taking: Reported on 9/24/2024)      [DISCONTINUED] ergocalciferol (VITAMIN D2) 50,000 units  (Patient not taking: Reported on 9/7/2023)      [DISCONTINUED] metFORMIN (GLUCOPHAGE) 1000 MG tablet Take 1 tablet (1,000 mg total) by mouth 2 (two) times a day with meals (Patient not taking: Reported on 9/24/2024) 180 tablet 1    [DISCONTINUED] naproxen (EC NAPROSYN) 500 MG EC tablet Take 1 tablet (500 mg total) by mouth 2 (two) times a day with meals (Patient not taking: Reported on 9/24/2024) 30 tablet 0     No current facility-administered medications on file prior to visit.      Social History     Tobacco Use    Smoking status: Never    Smokeless tobacco: Never   Vaping Use    Vaping status: Never Used   Substance and Sexual Activity    Alcohol use: Never    Drug use: Never    Sexual activity: Not on file       Objective     BP 92/68   Pulse 85   Temp (!) 97 °F (36.1 °C) (Tympanic)   Ht 4' 9.5\" (1.461 m)   Wt 76.6 kg (168 lb 12.8 oz)   SpO2 98%   BMI 35.90 kg/m²     Physical Exam  Vitals and nursing note reviewed.   Constitutional:       General: She is not in acute distress.     Appearance: She is obese. She is not ill-appearing, toxic-appearing or diaphoretic.   HENT:      Head: Normocephalic and atraumatic.      Right Ear: External ear normal.      Left Ear: External ear normal.      Nose: Nose normal.   Eyes:      General: No scleral icterus.        Right eye: No discharge.         Left eye: No discharge.      Extraocular Movements: " Extraocular movements intact.      Conjunctiva/sclera: Conjunctivae normal.   Neck:      Vascular: No carotid bruit.   Cardiovascular:      Rate and Rhythm: Normal rate and regular rhythm.      Pulses: no weak pulses.           Dorsalis pedis pulses are 2+ on the right side and 2+ on the left side.        Posterior tibial pulses are 2+ on the right side and 2+ on the left side.      Heart sounds: No murmur heard.  Pulmonary:      Effort: Pulmonary effort is normal. No respiratory distress.      Breath sounds: Normal breath sounds. No wheezing or rales.   Musculoskeletal:      Cervical back: Normal range of motion and neck supple.      Right lower leg: No edema.      Left lower leg: No edema.   Feet:      Right foot:      Skin integrity: No ulcer, skin breakdown, erythema, warmth, callus or dry skin.      Left foot:      Skin integrity: No ulcer, skin breakdown, erythema, warmth, callus or dry skin.   Skin:     Findings: No rash.   Neurological:      Mental Status: She is alert. Mental status is at baseline.   Psychiatric:         Mood and Affect: Mood normal.         Behavior: Behavior normal.         Thought Content: Thought content normal.         Judgment: Judgment normal.     Diabetic Foot Exam    Patient's shoes and socks removed.    Right Foot/Ankle   Right Foot Inspection  Skin Exam: skin normal. Skin not intact, no dry skin, no warmth, no callus, no erythema, no maceration, no abnormal color, no pre-ulcer, no ulcer and no callus.     Toe Exam: ROM and strength within normal limits.     Sensory   Monofilament testing: diminished    Vascular  Capillary refills: < 3 seconds  The right DP pulse is 2+. The right PT pulse is 2+.     Left Foot/Ankle  Left Foot Inspection  Skin Exam: skin normal. Skin not intact, no dry skin, no warmth, no erythema, no maceration, normal color, no pre-ulcer, no ulcer and no callus.     Toe Exam: ROM and strength within normal limits.     Sensory   Monofilament testing:  diminished    Vascular  Capillary refills: < 3 seconds  The left DP pulse is 2+. The left PT pulse is 2+.     Assign Risk Category  No deformity present  Loss of protective sensation  No weak pulses  Risk: 1

## 2024-09-24 NOTE — ASSESSMENT & PLAN NOTE
Patient has been noncompliant with metformin- States it causes GI upset and hasn't been taking any medications for diabetes  Stressed importance of good DM control in preventing health issues   Patient interested in glp1s for both weight loss and glucose control.   If not covered or availability issue, patient would likely need to go on insulin given most recent A1C  Advised to update labs before starting   Foot and eye exam completed today  Lab Results   Component Value Date    HGBA1C 12.6 (A) 09/07/2023       Orders:    IRIS Diabetic eye exam    Diabetic foot exam; Future    CBC and differential; Future    Comprehensive metabolic panel; Future    Hemoglobin A1C; Future    Lipid panel; Future    Albumin / creatinine urine ratio; Future    semaglutide, 0.25 or 0.5 mg/dose, (Ozempic, 0.25 or 0.5 MG/DOSE,) 2 mg/3 mL injection pen; 0.25 mg under the skin every 7 days for 4 doses (28 days), THEN 0.5 mg under the skin every 7 days

## 2024-09-24 NOTE — ASSESSMENT & PLAN NOTE
Controlled, continue current inhaler prn and avoid triggers      - Patient was counseled regarding: instructions for management, risk factor reductions, prognosis, patient and family education, risks and benefits of treatment options and importance of compliance with treatment  - Medication Side Effects: Adverse side effects of medications were reviewed with the patient/guardian today.

## 2024-09-26 ENCOUNTER — TELEPHONE (OUTPATIENT)
Age: 34
End: 2024-09-26

## 2024-09-26 NOTE — TELEPHONE ENCOUNTER
PA for Ozempic, 0.25 or 0.5 MG/DOSE SUBMITTED     via    [x]CMM-KEY: [id number]  []Surescripts-Case ID #   []Faxed to plan   []Other website   []Phone call Case ID #     Office notes sent, clinical questions answered. Awaiting determination    Turnaround time for your insurance to make a decision on your Prior Authorization can take 7-21 business days.

## 2024-11-06 ENCOUNTER — NURSE TRIAGE (OUTPATIENT)
Age: 34
End: 2024-11-06

## 2024-11-06 NOTE — TELEPHONE ENCOUNTER
"Pt warm transferred over by CTS MA.  Pt c/o of constipation for over 1 year.  States she cannot remember the last time she had a bowel movement.  Pt sounds very disinterested over the phone.  Denies abd pain or bloating.  Denies seeing any blood in toilet or on toilet paper.  When asked about hydration pt merely stated \"I drink a lot of water.\"    Advised pt if what she states about her situation is true it is advised she be seen in office.  Pt asked for just something to be prescribed for her but advised pt what she has described is very usual and advise she be seen. Pt scheduled to see ANNA Buck on Friday 11/8/24 at 1020.  Please review and advise if further recommendations at this time.    Reason for Disposition   Constipation is a recurrent ongoing problem (i.e., < 3 BMs / week or straining > 25% of the time)    Answer Assessment - Initial Assessment Questions  1. STOOL PATTERN OR FREQUENCY: \"How often do you have a bowel movement (BM)?\"  (Normal range: 3 times a day to every 3 days)  \"When was your last BM?\"        Pt states she can't remember the last time she had a BM.    2. STRAINING: \"Do you have to strain to have a BM?\"       Denies    3. ONSET: \"When did the constipation begin?\"      States she has been constipated for over a year    6. BLOOD ON STOOLS: \"Has there been any blood on the toilet tissue or on the surface of the BM?\" If Yes, ask: \"When was the last time?\"      Denies    7. CHRONIC CONSTIPATION: \"Is this a new problem for you?\"  If No, ask: \"How long have you had this problem?\" (days, weeks, months)       Over a year    8. CHANGES IN DIET OR HYDRATION: \"Have there been any recent changes in your diet?\" \"How much fluids are you drinking on a daily basis?\"  \"How much have you had to drink today?\"      Denies    9. MEDICINES: \"Have you been taking any new medicines?\" \"Are you taking any narcotic pain medicines?\" (e.g., Dilaudid, morphine, Percocet, Vicodin)      Denies    12. CAUSE: \"What do you " "think is causing the constipation?\"         Unsure    13. MEDICAL HISTORY: \"Do you have a history of hemorrhoids, rectal fissures, rectal surgery, or rectal abscess?\"          Denies    14. OTHER SYMPTOMS: \"Do you have any other symptoms?\" (e.g., abdomen pain, bloating, fever, vomiting)        Denies    15. PREGNANCY: \"Is there any chance you are pregnant?\" \"When was your last menstrual period?\"        Denies    Protocols used: Constipation-Adult-[place]    "

## 2024-11-06 NOTE — TELEPHONE ENCOUNTER
Patient has been having constipation issues going on a year. Patient does not remember the last time she had a BP. No abdominal pain.

## 2024-11-08 ENCOUNTER — OFFICE VISIT (OUTPATIENT)
Age: 34
End: 2024-11-08
Payer: COMMERCIAL

## 2024-11-08 ENCOUNTER — APPOINTMENT (OUTPATIENT)
Age: 34
End: 2024-11-08
Payer: COMMERCIAL

## 2024-11-08 ENCOUNTER — TELEPHONE (OUTPATIENT)
Age: 34
End: 2024-11-08

## 2024-11-08 VITALS
OXYGEN SATURATION: 98 % | HEART RATE: 96 BPM | TEMPERATURE: 97.8 F | RESPIRATION RATE: 18 BRPM | SYSTOLIC BLOOD PRESSURE: 122 MMHG | WEIGHT: 172.6 LBS | BODY MASS INDEX: 36.23 KG/M2 | HEIGHT: 58 IN | DIASTOLIC BLOOD PRESSURE: 70 MMHG

## 2024-11-08 DIAGNOSIS — E11.9 TYPE 2 DIABETES MELLITUS WITHOUT COMPLICATION, WITHOUT LONG-TERM CURRENT USE OF INSULIN (HCC): ICD-10-CM

## 2024-11-08 DIAGNOSIS — K59.00 CONSTIPATION, UNSPECIFIED CONSTIPATION TYPE: Primary | ICD-10-CM

## 2024-11-08 DIAGNOSIS — Z12.4 SCREENING FOR CERVICAL CANCER: ICD-10-CM

## 2024-11-08 DIAGNOSIS — K59.00 CONSTIPATION, UNSPECIFIED CONSTIPATION TYPE: ICD-10-CM

## 2024-11-08 DIAGNOSIS — E66.09 CLASS 2 OBESITY DUE TO EXCESS CALORIES WITHOUT SERIOUS COMORBIDITY WITH BODY MASS INDEX (BMI) OF 36.0 TO 36.9 IN ADULT: ICD-10-CM

## 2024-11-08 DIAGNOSIS — E66.812 CLASS 2 OBESITY DUE TO EXCESS CALORIES WITHOUT SERIOUS COMORBIDITY WITH BODY MASS INDEX (BMI) OF 36.0 TO 36.9 IN ADULT: ICD-10-CM

## 2024-11-08 PROBLEM — E66.813 CLASS 3 SEVERE OBESITY DUE TO EXCESS CALORIES WITHOUT SERIOUS COMORBIDITY WITH BODY MASS INDEX (BMI) OF 40.0 TO 44.9 IN ADULT (HCC): Status: ACTIVE | Noted: 2019-04-01

## 2024-11-08 PROBLEM — E66.01 CLASS 3 SEVERE OBESITY DUE TO EXCESS CALORIES WITHOUT SERIOUS COMORBIDITY WITH BODY MASS INDEX (BMI) OF 40.0 TO 44.9 IN ADULT (HCC): Status: ACTIVE | Noted: 2019-04-01

## 2024-11-08 LAB
ALBUMIN SERPL BCG-MCNC: 3.9 G/DL (ref 3.5–5)
ALP SERPL-CCNC: 98 U/L (ref 34–104)
ALT SERPL W P-5'-P-CCNC: 14 U/L (ref 7–52)
ANION GAP SERPL CALCULATED.3IONS-SCNC: 9 MMOL/L (ref 4–13)
AST SERPL W P-5'-P-CCNC: 15 U/L (ref 13–39)
BASOPHILS # BLD AUTO: 0.04 THOUSANDS/ÂΜL (ref 0–0.1)
BASOPHILS NFR BLD AUTO: 1 % (ref 0–1)
BILIRUB SERPL-MCNC: 0.33 MG/DL (ref 0.2–1)
BUN SERPL-MCNC: 21 MG/DL (ref 5–25)
CALCIUM SERPL-MCNC: 9.1 MG/DL (ref 8.4–10.2)
CHLORIDE SERPL-SCNC: 100 MMOL/L (ref 96–108)
CHOLEST SERPL-MCNC: 157 MG/DL
CO2 SERPL-SCNC: 26 MMOL/L (ref 21–32)
CREAT SERPL-MCNC: 0.63 MG/DL (ref 0.6–1.3)
CREAT UR-MCNC: 93.3 MG/DL
EOSINOPHIL # BLD AUTO: 0.31 THOUSAND/ÂΜL (ref 0–0.61)
EOSINOPHIL NFR BLD AUTO: 4 % (ref 0–6)
ERYTHROCYTE [DISTWIDTH] IN BLOOD BY AUTOMATED COUNT: 15.2 % (ref 11.6–15.1)
EST. AVERAGE GLUCOSE BLD GHB EST-MCNC: 306 MG/DL
GFR SERPL CREATININE-BSD FRML MDRD: 116 ML/MIN/1.73SQ M
GLUCOSE P FAST SERPL-MCNC: 162 MG/DL (ref 65–99)
HBA1C MFR BLD: 12.3 %
HCT VFR BLD AUTO: 38.5 % (ref 34.8–46.1)
HDLC SERPL-MCNC: 41 MG/DL
HGB BLD-MCNC: 12.1 G/DL (ref 11.5–15.4)
IMM GRANULOCYTES # BLD AUTO: 0.03 THOUSAND/UL (ref 0–0.2)
IMM GRANULOCYTES NFR BLD AUTO: 0 % (ref 0–2)
LDLC SERPL CALC-MCNC: 98 MG/DL (ref 0–100)
LYMPHOCYTES # BLD AUTO: 2.62 THOUSANDS/ÂΜL (ref 0.6–4.47)
LYMPHOCYTES NFR BLD AUTO: 37 % (ref 14–44)
MCH RBC QN AUTO: 22.2 PG (ref 26.8–34.3)
MCHC RBC AUTO-ENTMCNC: 31.4 G/DL (ref 31.4–37.4)
MCV RBC AUTO: 71 FL (ref 82–98)
MICROALBUMIN UR-MCNC: 11.8 MG/L
MICROALBUMIN/CREAT 24H UR: 13 MG/G CREATININE (ref 0–30)
MONOCYTES # BLD AUTO: 0.42 THOUSAND/ÂΜL (ref 0.17–1.22)
MONOCYTES NFR BLD AUTO: 6 % (ref 4–12)
NEUTROPHILS # BLD AUTO: 3.64 THOUSANDS/ÂΜL (ref 1.85–7.62)
NEUTS SEG NFR BLD AUTO: 52 % (ref 43–75)
NONHDLC SERPL-MCNC: 116 MG/DL
NRBC BLD AUTO-RTO: 0 /100 WBCS
PLATELET # BLD AUTO: 346 THOUSANDS/UL (ref 149–390)
PMV BLD AUTO: 10.5 FL (ref 8.9–12.7)
POTASSIUM SERPL-SCNC: 4 MMOL/L (ref 3.5–5.3)
PROT SERPL-MCNC: 7.2 G/DL (ref 6.4–8.4)
RBC # BLD AUTO: 5.45 MILLION/UL (ref 3.81–5.12)
SODIUM SERPL-SCNC: 135 MMOL/L (ref 135–147)
TRIGL SERPL-MCNC: 92 MG/DL
WBC # BLD AUTO: 7.06 THOUSAND/UL (ref 4.31–10.16)

## 2024-11-08 PROCEDURE — 83036 HEMOGLOBIN GLYCOSYLATED A1C: CPT

## 2024-11-08 PROCEDURE — 80053 COMPREHEN METABOLIC PANEL: CPT

## 2024-11-08 PROCEDURE — 82570 ASSAY OF URINE CREATININE: CPT

## 2024-11-08 PROCEDURE — 82043 UR ALBUMIN QUANTITATIVE: CPT

## 2024-11-08 PROCEDURE — 36415 COLL VENOUS BLD VENIPUNCTURE: CPT

## 2024-11-08 PROCEDURE — 80061 LIPID PANEL: CPT

## 2024-11-08 PROCEDURE — 85025 COMPLETE CBC W/AUTO DIFF WBC: CPT

## 2024-11-08 PROCEDURE — 74018 RADEX ABDOMEN 1 VIEW: CPT

## 2024-11-08 PROCEDURE — 99214 OFFICE O/P EST MOD 30 MIN: CPT

## 2024-11-08 RX ORDER — POLYETHYLENE GLYCOL 3350 17 G/17G
17 POWDER, FOR SOLUTION ORAL DAILY
Qty: 30 EACH | Refills: 5 | Status: SHIPPED | OUTPATIENT
Start: 2024-11-08 | End: 2024-11-14 | Stop reason: ALTCHOICE

## 2024-11-08 RX ORDER — SENNOSIDES A AND B 8.6 MG/1
2 TABLET, FILM COATED ORAL DAILY
Qty: 20 TABLET | Refills: 0 | Status: SHIPPED | OUTPATIENT
Start: 2024-11-08 | End: 2024-11-14 | Stop reason: ALTCHOICE

## 2024-11-08 NOTE — TELEPHONE ENCOUNTER
----- Message from Cielo Grider PA-C sent at 11/8/2024  2:33 PM EST -----  X-ray shows stool in the colon, but no obstruction.  No concerning findings.  Recommend the patient trial the stool softeners as prescribed and discussed and follow-up if symptoms worsen or fail to improve.

## 2024-11-08 NOTE — PATIENT INSTRUCTIONS
"Patient Education     Type 2 diabetes   The Basics   Written by the doctors and editors at [facility]   What is type 2 diabetes? -- This is a disorder that disrupts the way the body uses sugar. It is sometimes called type 2 diabetes mellitus.  All of the cells in the body need sugar to work normally. Sugar gets into the cells with the help of a hormone called insulin. Insulin is made by the pancreas, an organ in the belly. If there is not enough insulin, or if cells in the body don't respond normally to insulin, sugar builds up in the blood. That is what happens to people with diabetes.  There are 2 different types of diabetes:   In type 1 diabetes, the pancreas makes little or no insulin.   In type 2 diabetes, the pancreas still makes some insulin, but the cells in the body stop responding normally. Eventually, the pancreas cannot make enough insulin to keep up.  Having excess body weight or obesity increases a person's risk of developing type 2 diabetes. But people without excess body weight can get diabetes, too.  What are the symptoms of type 2 diabetes? -- Type 2 diabetes usually causes no symptoms. When symptoms do happen, they include:   Needing to urinate often   Intense thirst   Blurry vision  Can diabetes lead to other health problems? -- Yes. Type 2 diabetes might not make you feel sick. But if it is not managed, it can lead to serious problems over time, such as:   Heart attacks   Strokes   Kidney disease   Vision problems (or even blindness)   Pain or loss of feeling in the hands and feet   Needing to have fingers, toes, or other body parts removed (amputated)  How do I know if I have type 2 diabetes? -- Your doctor or nurse can do a blood test. There are 2 tests that can be used for this. Both involve measuring the amount of sugar in your blood, called your \"blood sugar\" or \"blood glucose\":   One of the tests measures your blood sugar at the time the blood sample is taken. This test is done in the " "morning. You can't eat or drink anything except water for at least 8 hours before the test.   The other test shows what your average blood sugar has been for the past 2 to 3 months. This blood test is called \"hemoglobin A1C\" or just \"A1C.\" It can be checked at any time of the day, even if you have recently eaten.  How is type 2 diabetes treated? -- The goals of treatment are to manage your blood sugar and lower the risk of future problems that can happen in people with diabetes.  Treatment might include:   Lifestyle changes - This is an important part of managing diabetes. It includes eating healthy foods and getting plenty of physical activity.   Medicines - There are a few medicines that help lower blood sugar. Some people need to take pills that help the body make more insulin or that help insulin do its job. Others need insulin shots.  Depending on what medicines you take, you might need to check your blood sugar regularly at home. But not everyone with type 2 diabetes needs to do this. Your doctor or nurse will tell you if you should be checking your blood sugar, and when and how to do this.  Sometimes, people with type 2 diabetes also need medicines to help prevent problems caused by the disease. For instance, medicines used to lower blood pressure can reduce the chances of a heart attack or stroke.   General medical care - It's also important to take care of other areas of your health. This includes watching your blood pressure and cholesterol levels. You should also get certain vaccines, such as vaccines to protect against the flu and coronavirus disease 2019 (\"COVID-19\"). Some people also need a vaccine to prevent pneumonia.  Can type 2 diabetes be prevented? -- Yes. To lower your chances of getting type 2 diabetes, the most important thing you can do is eat a healthy diet and get plenty of physical activity. This can help you lose weight if you are overweight. But eating well and being active are also good " for your overall health. Even gentle activity, like walking, has benefits.  If you smoke, quitting can also lower your risk of type 2 diabetes. Quitting smoking can be difficult, but your doctor or nurse can help.  All topics are updated as new evidence becomes available and our peer review process is complete.  This topic retrieved from [employer] on: Apr 24, 2024.  Topic 90681 Version 23.0  Release: 32.3.2 - C32.113  © 2024 UpToDate, Inc. and/or its affiliates. All rights reserved.  Consumer Information Use and Disclaimer   Disclaimer: This generalized information is a limited summary of diagnosis, treatment, and/or medication information. It is not meant to be comprehensive and should be used as a tool to help the user understand and/or assess potential diagnostic and treatment options. It does NOT include all information about conditions, treatments, medications, side effects, or risks that may apply to a specific patient. It is not intended to be medical advice or a substitute for the medical advice, diagnosis, or treatment of a health care provider based on the health care provider's examination and assessment of a patient's specific and unique circumstances. Patients must speak with a health care provider for complete information about their health, medical questions, and treatment options, including any risks or benefits regarding use of medications. This information does not endorse any treatments or medications as safe, effective, or approved for treating a specific patient. UpToDate, Inc. and its affiliates disclaim any warranty or liability relating to this information or the use thereof.The use of this information is governed by the Terms of Use, available at https://www.wolterskluwer.com/en/know/clinical-effectiveness-terms. 2024© UpToDate, Inc. and its affiliates and/or licensors. All rights reserved.  Copyright   © 2024 UpToDate, Inc. and/or its affiliates. All rights reserved.

## 2024-11-08 NOTE — ASSESSMENT & PLAN NOTE
Patient would like assistance with weight loss.  Discussed recommendations including diet and exercise.  Also recommend follow-up with weight management.  Referral placed.  Orders:    Ambulatory Referral to Weight Management; Future

## 2024-11-08 NOTE — PROGRESS NOTES
Ambulatory Visit  Name: Veronica Prescott      : 1990      MRN: [id number]  Encounter Provider: Cielo Grider PA-C  Encounter Date: 2024   Encounter department: [facility] [place]    Assessment & Plan  Constipation, unspecified constipation type  Recommend checking an x-ray of the abdomen to look for stool burden or other complications since the patient does not remember the last time she had a bowel movement.  Recommend she take Senokot 2 tablets a day for the next few days or until she has a significant bowel movement and also start taking 1 capful of MiraLAX daily.  Discussed that Senokot is a short-term treatment, whereas MiraLAX can be used more long-term to help with regular bowel movements.  Also recommended that she increase dietary fiber and water intake.  I discussed examples of high-fiber foods and also provided a handout with more information about high-fiber diet and constipation.  Recommend increased exercise to help with regular bowel movements as well.  I will follow-up with any abnormal results on x-ray imaging.  I provided the patient with ER precautions to go to the ER if her symptoms worsen or if she develops nausea, vomiting, severe abdominal pain.  Orders:    senna (SENOKOT) 8.6 MG tablet; Take 2 tablets (17.2 mg total) by mouth daily For 3-5 days, or until you have a significant bowel movement    polyethylene glycol (MIRALAX) 17 g packet; Take 17 g by mouth daily    XR abdomen 1 view kub; Future    Screening for cervical cancer  Patient has an IUD in place that she states was supposed to come out earlier this year.  Recommend follow-up with gynecology for discussion of IUD management and also for Pap smear.  Orders:    Ambulatory referral to Obstetrics / Gynecology; Future    Type 2 diabetes mellitus without complication, without long-term current use of insulin (HCC)    Lab Results   Component Value Date    HGBA1C 12.6 (A) 2023   Patient is due for repeat  routine lab work.  She has active orders placed from her recent annual physical.  Since she is going downstairs for abdominal x-ray I recommended that she get her routine labs done as well.  Patient agrees to get lab work done.  Last office visit note for her annual from 9/24 reviewed and at LOV it was discussed that depending on A1c patient may need insulin or GLP-1.         Class 2 obesity due to excess calories without serious comorbidity with body mass index (BMI) of 36.0 to 36.9 in adult  Patient would like assistance with weight loss.  Discussed recommendations including diet and exercise.  Also recommend follow-up with weight management.  Referral placed.  Orders:    Ambulatory Referral to Weight Management; Future       History of Present Illness     Veronica is a 34-year-old female presenting to the office with complaint of chronic constipation.  She does not number the last time she had a bowel movement.  She is struggled with constipation for years.  She has not tried anything for it because she does not know what to take.  She states she feels bloated and will occasionally get some epigastric pain after eating.  She denies any blood in the stool or on toilet paper after having a bowel movement.  Discussed recommendation to get x-ray done to look for stool burden.  When asking with the patient could currently be pregnant, she states she has an IUD in place, but states that was post, earlier this year.  It was placed in 2019.  She states that it was not taking out because her provider is no longer with that office.  She followed up with them and they stated that it can stay in place for 7 years.  She does not get periods with the IUD in place.              Review of Systems   Constitutional:  Negative for chills and fever.   HENT:  Negative for congestion, ear pain and sore throat.    Eyes:  Negative for pain and visual disturbance.   Respiratory:  Negative for cough and shortness of breath.   "  Cardiovascular:  Negative for chest pain and palpitations.   Gastrointestinal:  Positive for abdominal distention (Bloating), abdominal pain (Intermittent epigastric pain.  None currently.) and nausea (Intermittent.). Negative for anal bleeding, blood in stool, constipation, diarrhea and vomiting.   Genitourinary:  Negative for dysuria and hematuria.   Musculoskeletal:  Negative for arthralgias, back pain and myalgias.   Skin:  Negative for color change and rash.   Neurological:  Negative for dizziness, seizures, syncope and headaches.   All other systems reviewed and are negative.          Objective     /70 (BP Location: Right arm, Patient Position: Sitting, Cuff Size: Standard)   Pulse 96   Temp 97.8 °F (36.6 °C) (Tympanic)   Resp 18   Ht 4' 9.5\" (1.461 m)   Wt 78.3 kg (172 lb 9.6 oz)   SpO2 98%   BMI 36.70 kg/m²     Physical Exam  Vitals and nursing note reviewed.   Constitutional:       General: She is not in acute distress.     Appearance: She is well-developed.   HENT:      Head: Normocephalic and atraumatic.      Right Ear: Tympanic membrane normal.      Left Ear: Tympanic membrane normal.      Nose: Nose normal.      Mouth/Throat:      Mouth: Mucous membranes are moist.      Pharynx: Oropharynx is clear. No oropharyngeal exudate.   Eyes:      Extraocular Movements: Extraocular movements intact.      Conjunctiva/sclera: Conjunctivae normal.   Cardiovascular:      Rate and Rhythm: Normal rate and regular rhythm.      Heart sounds: Normal heart sounds. No murmur heard.     No friction rub. No gallop.   Pulmonary:      Effort: Pulmonary effort is normal. No respiratory distress.      Breath sounds: Normal breath sounds. No wheezing, rhonchi or rales.   Abdominal:      General: Bowel sounds are normal. There is distension.      Palpations: Abdomen is soft.      Tenderness: There is no abdominal tenderness. There is no guarding.      Comments: Bowel sounds present, but slightly diminished.  No pain " to palpation of the abdomen.  Abdomen appears slightly distended.   Musculoskeletal:         General: No swelling.      Cervical back: Neck supple.   Skin:     General: Skin is warm and dry.      Capillary Refill: Capillary refill takes less than 2 seconds.   Neurological:      General: No focal deficit present.      Mental Status: She is alert.   Psychiatric:         Mood and Affect: Mood normal.       Administrative Statements   I have spent a total time of 40 minutes in caring for this patient on the day of the visit/encounter including Diagnostic results, Prognosis, Risks and benefits of tx options, Instructions for management, Patient and family education, Importance of tx compliance, Risk factor reductions, Impressions, Documenting in the medical record, Reviewing / ordering tests, medicine, procedures  , and Obtaining or reviewing history  .

## 2024-11-08 NOTE — ASSESSMENT & PLAN NOTE
Lab Results   Component Value Date    HGBA1C 12.6 (A) 09/07/2023   Patient is due for repeat routine lab work.  She has active orders placed from her recent annual physical.  Since she is going downstairs for abdominal x-ray I recommended that she get her routine labs done as well.  Patient agrees to get lab work done.  Last office visit note for her annual from 9/24 reviewed and at LOV it was discussed that depending on A1c patient may need insulin or GLP-1.

## 2024-11-11 ENCOUNTER — TELEPHONE (OUTPATIENT)
Age: 34
End: 2024-11-11

## 2024-11-11 NOTE — TELEPHONE ENCOUNTER
----- Message from Slava Winters PA-C sent at 11/11/2024  8:23 AM EST -----  Please advise patient to be seen to discuss medication options for diabetes as her A1C is significantly high at 12.3

## 2024-11-14 ENCOUNTER — OFFICE VISIT (OUTPATIENT)
Age: 34
End: 2024-11-14
Payer: COMMERCIAL

## 2024-11-14 VITALS
OXYGEN SATURATION: 98 % | HEIGHT: 58 IN | BODY MASS INDEX: 36.53 KG/M2 | SYSTOLIC BLOOD PRESSURE: 116 MMHG | TEMPERATURE: 96.5 F | DIASTOLIC BLOOD PRESSURE: 72 MMHG | HEART RATE: 93 BPM | RESPIRATION RATE: 18 BRPM | WEIGHT: 174 LBS

## 2024-11-14 DIAGNOSIS — E11.9 TYPE 2 DIABETES MELLITUS WITHOUT COMPLICATION, WITHOUT LONG-TERM CURRENT USE OF INSULIN (HCC): Primary | ICD-10-CM

## 2024-11-14 DIAGNOSIS — Z23 ENCOUNTER FOR IMMUNIZATION: ICD-10-CM

## 2024-11-14 PROCEDURE — 99214 OFFICE O/P EST MOD 30 MIN: CPT

## 2024-11-14 PROCEDURE — 90656 IIV3 VACC NO PRSV 0.5 ML IM: CPT

## 2024-11-14 PROCEDURE — 90471 IMMUNIZATION ADMIN: CPT

## 2024-11-14 RX ORDER — LANCETS 33 GAUGE
EACH MISCELLANEOUS
Qty: 300 EACH | Refills: 3 | Status: SHIPPED | OUTPATIENT
Start: 2024-11-14

## 2024-11-14 RX ORDER — GLIPIZIDE 5 MG/1
5 TABLET, FILM COATED, EXTENDED RELEASE ORAL DAILY
Qty: 30 TABLET | Refills: 5 | Status: SHIPPED | OUTPATIENT
Start: 2024-11-14

## 2024-11-14 RX ORDER — BLOOD SUGAR DIAGNOSTIC
STRIP MISCELLANEOUS
Qty: 300 EACH | Refills: 3 | Status: SHIPPED | OUTPATIENT
Start: 2024-11-14

## 2024-11-14 RX ORDER — BLOOD-GLUCOSE METER
KIT MISCELLANEOUS
Qty: 1 KIT | Refills: 0 | Status: SHIPPED | OUTPATIENT
Start: 2024-11-14

## 2024-11-14 NOTE — ASSESSMENT & PLAN NOTE
Lab Results   Component Value Date    HGBA1C 12.3 (H) 11/08/2024   Diabetes is uncontrolled.  Patient did not tolerate metformin secondary to abdominal upset and never started the Ozempic that she was prescribed.  I had a long and detailed discussion with the patient about the importance of diabetic control and risks of uncontrolled diabetes including, but not limited to, cardiovascular disease, kidney disease, neuropathy. Discussed that with her sugars as high as they are, she will need multiple diabetic agents. We will start her on low dose jardiance and glipizide XL and once weekly Ozempic and have the patient return in 1 month for monitoring.  I have reordered glucose monitoring supplies for the patient since she lost hers and instructed her to check blood sugars 3 times daily.  Discussed goals for blood sugar management including fasting blood sugar of .  Patient is likely not a candidate for CGM coverage since she is not on insulin. Discussed benefits, risk, and common side effects of the medications.  Discussed symptoms to watch out for that would indicate hypoglycemia.  Follow-up in 1 month and sooner as needed.  Discussed that if we are unable to get blood sugar under control with non-insulin therapies, then she may need daily insulin or follow-up with endocrinology.  Provided booklets with information about diabetes,l dietary recommendations, and blood sugar management. Also recommend follow up with diabetes education. Referral placed.     Orders:    Ambulatory referral to Diabetic Education - use to refer for diabetes group classes, individual diabetes education, medical nutrition therapy, device training; Future    Blood Glucose Monitoring Suppl (OneTouch Verio Reflect) w/Device KIT; Check blood sugars three times daily. Please substitute with appropriate alternative as covered by patient's insurance. Dx: E11.65    glucose blood (OneTouch Verio) test strip; Check blood sugars three times daily.  Please substitute with appropriate alternative as covered by patient's insurance. Dx: E11.65    OneTouch Delica Lancets 33G MISC; Check blood sugars three times daily. Please substitute with appropriate alternative as covered by patient's insurance. Dx: E11.65    Empagliflozin (JARDIANCE) 10 MG TABS tablet; Take 1 tablet (10 mg total) by mouth daily    glipiZIDE (GLUCOTROL XL) 5 mg 24 hr tablet; Take 1 tablet (5 mg total) by mouth daily    semaglutide, 0.25 or 0.5 mg/dose, (Ozempic, 0.25 or 0.5 MG/DOSE,) 2 mg/3 mL injection pen; 0.25 mg under the skin every 7 days for 4 doses (28 days), THEN 0.5 mg under the skin every 7 days

## 2024-11-14 NOTE — PATIENT INSTRUCTIONS
"Patient Education     Type 2 diabetes   The Basics   Written by the doctors and editors at [facility]   What is type 2 diabetes? -- This is a disorder that disrupts the way the body uses sugar. It is sometimes called type 2 diabetes mellitus.  All of the cells in the body need sugar to work normally. Sugar gets into the cells with the help of a hormone called insulin. Insulin is made by the pancreas, an organ in the belly. If there is not enough insulin, or if cells in the body don't respond normally to insulin, sugar builds up in the blood. That is what happens to people with diabetes.  There are 2 different types of diabetes:   In type 1 diabetes, the pancreas makes little or no insulin.   In type 2 diabetes, the pancreas still makes some insulin, but the cells in the body stop responding normally. Eventually, the pancreas cannot make enough insulin to keep up.  Having excess body weight or obesity increases a person's risk of developing type 2 diabetes. But people without excess body weight can get diabetes, too.  What are the symptoms of type 2 diabetes? -- Type 2 diabetes usually causes no symptoms. When symptoms do happen, they include:   Needing to urinate often   Intense thirst   Blurry vision  Can diabetes lead to other health problems? -- Yes. Type 2 diabetes might not make you feel sick. But if it is not managed, it can lead to serious problems over time, such as:   Heart attacks   Strokes   Kidney disease   Vision problems (or even blindness)   Pain or loss of feeling in the hands and feet   Needing to have fingers, toes, or other body parts removed (amputated)  How do I know if I have type 2 diabetes? -- Your doctor or nurse can do a blood test. There are 2 tests that can be used for this. Both involve measuring the amount of sugar in your blood, called your \"blood sugar\" or \"blood glucose\":   One of the tests measures your blood sugar at the time the blood sample is taken. This test is done in the " "morning. You can't eat or drink anything except water for at least 8 hours before the test.   The other test shows what your average blood sugar has been for the past 2 to 3 months. This blood test is called \"hemoglobin A1C\" or just \"A1C.\" It can be checked at any time of the day, even if you have recently eaten.  How is type 2 diabetes treated? -- The goals of treatment are to manage your blood sugar and lower the risk of future problems that can happen in people with diabetes.  Treatment might include:   Lifestyle changes - This is an important part of managing diabetes. It includes eating healthy foods and getting plenty of physical activity.   Medicines - There are a few medicines that help lower blood sugar. Some people need to take pills that help the body make more insulin or that help insulin do its job. Others need insulin shots.  Depending on what medicines you take, you might need to check your blood sugar regularly at home. But not everyone with type 2 diabetes needs to do this. Your doctor or nurse will tell you if you should be checking your blood sugar, and when and how to do this.  Sometimes, people with type 2 diabetes also need medicines to help prevent problems caused by the disease. For instance, medicines used to lower blood pressure can reduce the chances of a heart attack or stroke.   General medical care - It's also important to take care of other areas of your health. This includes watching your blood pressure and cholesterol levels. You should also get certain vaccines, such as vaccines to protect against the flu and coronavirus disease 2019 (\"COVID-19\"). Some people also need a vaccine to prevent pneumonia.  Can type 2 diabetes be prevented? -- Yes. To lower your chances of getting type 2 diabetes, the most important thing you can do is eat a healthy diet and get plenty of physical activity. This can help you lose weight if you are overweight. But eating well and being active are also good " for your overall health. Even gentle activity, like walking, has benefits.  If you smoke, quitting can also lower your risk of type 2 diabetes. Quitting smoking can be difficult, but your doctor or nurse can help.  All topics are updated as new evidence becomes available and our peer review process is complete.  This topic retrieved from [employer] on: Apr 24, 2024.  Topic 50653 Version 23.0  Release: 32.3.2 - C32.113  © 2024 UpToDate, Inc. and/or its affiliates. All rights reserved.  Consumer Information Use and Disclaimer   Disclaimer: This generalized information is a limited summary of diagnosis, treatment, and/or medication information. It is not meant to be comprehensive and should be used as a tool to help the user understand and/or assess potential diagnostic and treatment options. It does NOT include all information about conditions, treatments, medications, side effects, or risks that may apply to a specific patient. It is not intended to be medical advice or a substitute for the medical advice, diagnosis, or treatment of a health care provider based on the health care provider's examination and assessment of a patient's specific and unique circumstances. Patients must speak with a health care provider for complete information about their health, medical questions, and treatment options, including any risks or benefits regarding use of medications. This information does not endorse any treatments or medications as safe, effective, or approved for treating a specific patient. UpToDate, Inc. and its affiliates disclaim any warranty or liability relating to this information or the use thereof.The use of this information is governed by the Terms of Use, available at https://www.wolterskluwer.com/en/know/clinical-effectiveness-terms. 2024© UpToDate, Inc. and its affiliates and/or licensors. All rights reserved.  Copyright   © 2024 UpToDate, Inc. and/or its affiliates. All rights reserved.

## 2024-11-14 NOTE — PROGRESS NOTES
Name: Veronica Prescott      : 1990      MRN: [id number]  Encounter Provider: Cielo Grider PA-C  Encounter Date: 2024   Encounter department: [facility]  :  Assessment & Plan  Type 2 diabetes mellitus without complication, without long-term current use of insulin ([facility])    Lab Results   Component Value Date    HGBA1C 12.3 (H) 2024   Diabetes is uncontrolled.  Patient did not tolerate metformin secondary to abdominal upset and never started the Ozempic that she was prescribed.  I had a long and detailed discussion with the patient about the importance of diabetic control and risks of uncontrolled diabetes including, but not limited to, cardiovascular disease, kidney disease, neuropathy. Discussed that with her sugars as high as they are, she will need multiple diabetic agents. We will start her on low dose jardiance and glipizide XL and once weekly Ozempic and have the patient return in 1 month for monitoring.  I have reordered glucose monitoring supplies for the patient since she lost hers and instructed her to check blood sugars 3 times daily.  Discussed goals for blood sugar management including fasting blood sugar of .  Patient is likely not a candidate for CGM coverage since she is not on insulin. Discussed benefits, risk, and common side effects of the medications.  Discussed symptoms to watch out for that would indicate hypoglycemia.  Follow-up in 1 month and sooner as needed.  Discussed that if we are unable to get blood sugar under control with non-insulin therapies, then she may need daily insulin or follow-up with endocrinology.  Provided booklets with information about diabetes,l dietary recommendations, and blood sugar management. Also recommend follow up with diabetes education. Referral placed.     Orders:    Ambulatory referral to Diabetic Education - use to refer for diabetes group classes, individual diabetes education, medical nutrition therapy,  device training; Future    Blood Glucose Monitoring Suppl (OneTouch Verio Reflect) w/Device KIT; Check blood sugars three times daily. Please substitute with appropriate alternative as covered by patient's insurance. Dx: E11.65    glucose blood (OneTouch Verio) test strip; Check blood sugars three times daily. Please substitute with appropriate alternative as covered by patient's insurance. Dx: E11.65    OneTouch Delica Lancets 33G MISC; Check blood sugars three times daily. Please substitute with appropriate alternative as covered by patient's insurance. Dx: E11.65    Empagliflozin (JARDIANCE) 10 MG TABS tablet; Take 1 tablet (10 mg total) by mouth daily    glipiZIDE (GLUCOTROL XL) 5 mg 24 hr tablet; Take 1 tablet (5 mg total) by mouth daily    semaglutide, 0.25 or 0.5 mg/dose, (Ozempic, 0.25 or 0.5 MG/DOSE,) 2 mg/3 mL injection pen; 0.25 mg under the skin every 7 days for 4 doses (28 days), THEN 0.5 mg under the skin every 7 days    Encounter for immunization  Flu vaccine administered today  Orders:    influenza vaccine preservative-free 0.5 mL IM (Fluzone, Afluria, Fluarix, Flulaval)           History of Present Illness     Patient presents for follow-up for diabetic management.  A1c is still uncontrolled at 12.3.  Patient is not currently taking any diabetic medication.  She is no longer on metformin because it was causing stomach and abdominal upset in the past.  She never started the Ozempic that she was prescribed.  She lost her glucometer and does not like to do the fingersticks to check her blood sugar.         Review of Systems   Constitutional:  Negative for chills and fever.   HENT:  Negative for congestion, ear pain and sore throat.    Eyes:  Negative for pain and visual disturbance.   Respiratory:  Negative for cough and shortness of breath.    Cardiovascular:  Negative for chest pain and palpitations.   Gastrointestinal:  Negative for abdominal pain, constipation, diarrhea and vomiting.  "  Genitourinary:  Negative for dysuria and hematuria.   Musculoskeletal:  Negative for arthralgias, back pain and myalgias.   Skin:  Negative for color change and rash.   Neurological:  Negative for dizziness, seizures, syncope and headaches.   All other systems reviewed and are negative.         Objective   /72 (BP Location: Right arm, Patient Position: Sitting, Cuff Size: Large)   Pulse 93   Temp (!) 96.5 °F (35.8 °C) (Tympanic)   Resp 18   Ht 4' 9.5\" (1.461 m)   Wt 78.9 kg (174 lb)   SpO2 98%   BMI 37.00 kg/m²      Physical Exam  Vitals and nursing note reviewed.   Constitutional:       General: She is not in acute distress.     Appearance: She is well-developed.   HENT:      Head: Normocephalic and atraumatic.      Nose: Nose normal.      Mouth/Throat:      Mouth: Mucous membranes are moist.      Pharynx: Oropharynx is clear. No oropharyngeal exudate.   Eyes:      Extraocular Movements: Extraocular movements intact.      Conjunctiva/sclera: Conjunctivae normal.   Cardiovascular:      Rate and Rhythm: Normal rate and regular rhythm.      Heart sounds: Normal heart sounds. No murmur heard.     No friction rub. No gallop.   Pulmonary:      Effort: Pulmonary effort is normal. No respiratory distress.      Breath sounds: Normal breath sounds. No wheezing, rhonchi or rales.   Musculoskeletal:         General: No swelling.      Cervical back: Neck supple.   Skin:     General: Skin is warm and dry.      Capillary Refill: Capillary refill takes less than 2 seconds.   Neurological:      General: No focal deficit present.      Mental Status: She is alert.   Psychiatric:         Mood and Affect: Mood normal.         "

## 2024-11-23 ENCOUNTER — VBI (OUTPATIENT)
Dept: ADMINISTRATIVE | Facility: OTHER | Age: 34
End: 2024-11-23

## 2024-11-23 NOTE — TELEPHONE ENCOUNTER
11/23/24 9:33 AM     Chart reviewed for Diabetic Eye Exam was/were not submitted to the patient's insurance.     Daina Acosta MA   [facility]

## 2024-12-06 ENCOUNTER — TELEPHONE (OUTPATIENT)
Dept: ENDOCRINOLOGY | Facility: CLINIC | Age: 34
End: 2024-12-06

## 2024-12-06 ENCOUNTER — TELEPHONE (OUTPATIENT)
Dept: LAB | Facility: HOSPITAL | Age: 34
End: 2024-12-06

## 2024-12-11 ENCOUNTER — TELEMEDICINE (OUTPATIENT)
Dept: DIABETES SERVICES | Facility: CLINIC | Age: 34
End: 2024-12-11
Payer: COMMERCIAL

## 2024-12-11 DIAGNOSIS — E11.9 TYPE 2 DIABETES MELLITUS WITHOUT COMPLICATION, WITHOUT LONG-TERM CURRENT USE OF INSULIN (HCC): Primary | ICD-10-CM

## 2024-12-11 PROCEDURE — 97802 MEDICAL NUTRITION INDIV IN: CPT

## 2024-12-11 NOTE — PROGRESS NOTES
Virtual Regular Visit  Name: Veronica Prescott      : 1990      MRN: [id number]  Encounter Provider: Lidia Richardson RD  Encounter Date: 2024   Encounter department: [facility]      Verification of patient location:  Patient is located at Home in the following state in which I hold an active license [place] :  Assessment & Plan        Encounter provider Lidia Richardson RD    The patient was identified by name and date of birth. Veronica Prescott was informed that this is a telemedicine visit and that the visit is being conducted through the Epic Embedded platform. She agrees to proceed..  My office door was closed. No one else was in the room.  She acknowledged consent and understanding of privacy and security of the video platform. The patient has agreed to participate and understands they can discontinue the visit at any time.    Patient is aware this is a billable service.     History of Present Illness     HPI  Review of Systems    Objective   There were no vitals taken for this visit.    Physical Exam    Visit Time  Total Visit Duration: 60 minutes    Medical Nutrition Therapy        Assessment    Visit Type: Initial visit  Chief complaint/Medical Diagnosis/reason for visit E11.9    HPI Veronica was seen in person for the initial MNT appointment. BG levels are checked 3x/day. FBG in the morning is usually 170-200 mg/dL, before lunch is 120-140 mg/dL, before dinner is 200-300 mg/dL. Denies any low BG levels. Patient does take diabetes medication as prescribed.     Patient reported no current exercise regimen, except walking her dog daily for about 10 minutes. Explained how exercise lowers BG levels by creating more demand for glucose in the muscle cells. Encouraged additional exercise.    Veronica shared she eats large portions and even when she feels full she will force herself to finish her food. Discussed this and strategies to reduce her portions.    Problems identified in  "food recall include meal skipping, inconsistent carbohydrate intake, high-fat and high-sodium convenience foods, high-fat dairy, limited non-starchy vegetables and whole grains, sugary beverages, and sweets. Consumption of carbohydrates ranges from 90  to 180 grams per meal. Explained basic pathophysiology of diabetes and impact of diet on blood glucose levels and disease complications. Explained how sodium influences volume retention, blood pressure, and complications for heart disease, stroke, and CKD.     Provided patient with a 1139 calorie meal plan to assist with consistency, balance and portion control.  Encouraged the consumption of regular meals at regular times.  Advised patient to keep carbohydrate intake to 30 grams per meal and 15 per snack to assist with glycemic control.  Suggested keeping protein intake to 5 ounces a day and fat to 3 servings daily to assist with lipid management and calorie control. Portion booklet and food labels were used to teach basic carbohydrate counting. Patient agreed to keep daily food logs and return them in 2 months for assessment. RD will remain available for further dietary questions/concerns.     Ht Readings from Last 1 Encounters:   11/14/24 4' 9.5\" (1.461 m)     Wt Readings from Last 3 Encounters:   11/14/24 78.9 kg (174 lb)   11/08/24 78.3 kg (172 lb 9.6 oz)   09/24/24 76.6 kg (168 lb 12.8 oz)     Weight Change: Yes 6 lbs increase in about 3 months    Barriers to Learning: no barriers    Do you follow any special diet presently?: No  Who shops: patient  Who cooks: patient    Food Log: Completed via the method of food recall    Wakes up 6-8 am    Breakfast: Eats right after waking up; skips; eggs w/ oil with 2 slices of bread with cheese with water OR 1/2 of a box (16 oz) spaghetti with 8 hot dogs with tomato paste   Morning Snack:none; 12 pm; coffee  Lunch: skips; 3-4 pm; 2 c cooked rice with chicken (boiled or air fried or fried in oil; with skin) with " water  Afternoon Snack: none  Dinner: 8-10 pm; boiled chicken   Evening Snack:none  Beverages: water, (No soda and juice for a month or 2), coffee (regular w/ whole milk with 2 tsp sugar)  Eating out/Take out:1x/ month; [employer]-- fried chicken with 1 biscuit  Exercise walks dog twice daily for 10 minutes    Calorie needs 1139 kcals/day Carbs: 30 g/meal, 15 g/snack     Protein:5 ounces/day    Fat: 3 servings/day        Nutrition Diagnosis:  Food and nutrition related knowledge deficit  related to Lack of prior exposure to accurate nutrition related information as evidenced by Verbalizes inaccurate or incomplete information    Intervention: plate method, increased fiber intake, label reading, carbohydrate counting, meal timing, meal planning, and food diary     Treatment Goals: Patient understands education and recommendations, Patient will monitor food intake daily with tracker, Patient will consume 3 meals a day, Patient will monitor portion control, Patient will count carbohydrates, Patient will exercise, and Patient will monitor blood glucose    Monitoring and evaluation:    Term code indicator  FH 1.3.2 Food Intake Criteria: Eat 3 meals per day, 4-5 hours apart. No meal skipping.   Term code indicator  FH 1.6.3 Carbohydrate Intake Criteria: Eat 30 grams of carbohydrate per meal, and no more than 15 grams per snack.  Term code indicator  CH 2.2 Treatments/Therapy/Alternative Medicine Criteria: Continue walking your dog daily and add more exercise in as able.    Materials Provided: portion book, 3-day food log    Patient’s Response to Instruction:  Comprehensiongood  Motivationgood  Expected Compliancegood    Begin Time: 1:00 pm  End Time: 2:00 pm  Referring Provider: Cielo Grider PA-C    Thank you for coming to the [facility] for education today.  Please feel free to call with any questions or concerns.    Lidia Richardson, RD  [address] [place] [address] [phone]

## 2024-12-11 NOTE — PATIENT INSTRUCTIONS
Eat 3 meals per day, 4-5 hours apart. No meal skipping.     Eat 30 grams of carbohydrate per meal, and no more than 15 grams per snack.    Continue walking your dog daily and add more exercise in as able.    Complete 3-day food log and return completed log 1-2 days before the follow-up session to [email or web address]

## 2024-12-19 ENCOUNTER — OFFICE VISIT (OUTPATIENT)
Age: 34
End: 2024-12-19
Payer: COMMERCIAL

## 2024-12-19 VITALS
HEIGHT: 58 IN | RESPIRATION RATE: 18 BRPM | OXYGEN SATURATION: 99 % | BODY MASS INDEX: 37.57 KG/M2 | TEMPERATURE: 96.1 F | DIASTOLIC BLOOD PRESSURE: 74 MMHG | HEART RATE: 97 BPM | SYSTOLIC BLOOD PRESSURE: 120 MMHG | WEIGHT: 179 LBS

## 2024-12-19 DIAGNOSIS — E11.9 TYPE 2 DIABETES MELLITUS WITHOUT COMPLICATION, WITHOUT LONG-TERM CURRENT USE OF INSULIN (HCC): Primary | ICD-10-CM

## 2024-12-19 PROCEDURE — 99213 OFFICE O/P EST LOW 20 MIN: CPT

## 2024-12-19 RX ORDER — GLIPIZIDE 5 MG/1
5 TABLET, FILM COATED, EXTENDED RELEASE ORAL DAILY
Qty: 30 TABLET | Refills: 5 | Status: SHIPPED | OUTPATIENT
Start: 2024-12-19

## 2024-12-19 NOTE — PROGRESS NOTES
Name: Veronica Prescott      : 1990      MRN: [id number]  Encounter Provider: Cielo Grider PA-C  Encounter Date: 2024   Encounter department: [facility]  :  Assessment & Plan  Type 2 diabetes mellitus without complication, without long-term current use of insulin ([facility])    Lab Results   Component Value Date    HGBA1C 12.3 (H) 2024     Orders:  •  glipiZIDE (GLUCOTROL XL) 5 mg 24 hr tablet; Take 1 tablet (5 mg total) by mouth daily  •  Empagliflozin (JARDIANCE) 10 MG TABS tablet; Take 1 tablet (10 mg total) by mouth daily  •  semaglutide, 0.25 or 0.5 mg/dose, (Ozempic, 0.25 or 0.5 MG/DOSE,) 2 mg/3 mL injection pen; Inject 0.75 mL (0.5 mg total) under the skin every 7 days  Patient states that the pharmacy only gave her 1 oral medication for diabetes instead of 2 as prescribed at last visit.  She is not sure which pill she is taking, the glipizide or the Jardiance. Per chart review it appears that jardiance was anever dispensed, but glipizide was. I have resent both medications and instructed the patient to speak to the pharmacy to ensure she gets both Jardiance and glipizide.  She tolerated the 0.25 mg dose of Ozempic, so we will plan to increase to 0.5 mg once weekly.  I instructed the patient to message me in a couple of weeks if she is tolerating the 0.5 mg dose, then I will send in for the next dose up. Recommend she follow up in the office in 2 months so that we can recheck her A1C.  Reach out sooner with any side effects medication or episodes of hypoglycemia.  Encouraged her to continue to take her blood sugars daily and keep a log of the readings.  Encouraged her to cut back on carbohydrate and sugar intake.           History of Present Illness {?Quick Links Encounters * My Last Note * Last Note in Specialty * Snapshot * Since Last Visit * History :67111}    Veronica is a 34-year-old female presenting to the office for follow-up for diabetic management.  She started  "Ozempic as prescribed and is tolerating it well without side effects. When asked if she started the jardiance or glipizide, she states she is not sure which she is taking but she is only taking one of them because the pharmacy only gave her one of the prescriptions after our last visit.  She has been checking her blood sugars about once daily and seeing morning readings of around 168. Her diet consists of foods like rice, chicken, and pasta.         Review of Systems   Constitutional:  Negative for chills and fever.   HENT:  Negative for congestion, ear pain and sore throat.    Eyes:  Negative for pain and visual disturbance.   Respiratory:  Negative for cough and shortness of breath.    Cardiovascular:  Negative for chest pain and palpitations.   Gastrointestinal:  Negative for abdominal pain, constipation, diarrhea and vomiting.   Genitourinary:  Negative for dysuria and hematuria.   Musculoskeletal:  Negative for arthralgias, back pain and myalgias.   Skin:  Negative for color change and rash.   Neurological:  Negative for dizziness, seizures, syncope and headaches.   All other systems reviewed and are negative.      Objective {?Quick Links Trend Vitals * Enter New Vitals * Results Review * Timeline (Adult) * Labs * Imaging * Cardiology * Procedures * Lung Cancer Screening * Surgical eConsent :74564}  /74   Pulse 97   Temp (!) 96.1 °F (35.6 °C) (Temporal)   Resp 18   Ht 4' 9.5\" (1.461 m)   Wt 81.2 kg (179 lb)   SpO2 99%   BMI 38.06 kg/m²      Physical Exam  Vitals and nursing note reviewed.   Constitutional:       General: She is not in acute distress.     Appearance: She is well-developed.   HENT:      Head: Normocephalic and atraumatic.   Eyes:      Extraocular Movements: Extraocular movements intact.      Conjunctiva/sclera: Conjunctivae normal.   Cardiovascular:      Rate and Rhythm: Normal rate and regular rhythm.      Heart sounds: Normal heart sounds. No murmur heard.     No friction rub. No " gallop.   Pulmonary:      Effort: Pulmonary effort is normal. No respiratory distress.      Breath sounds: Normal breath sounds. No wheezing, rhonchi or rales.   Musculoskeletal:         General: No swelling.   Skin:     General: Skin is warm and dry.      Capillary Refill: Capillary refill takes less than 2 seconds.   Neurological:      General: No focal deficit present.      Mental Status: She is alert. Mental status is at baseline.   Psychiatric:         Mood and Affect: Mood normal.         Behavior: Behavior normal.

## 2024-12-19 NOTE — PATIENT INSTRUCTIONS
Speak to pharmacy and ask for script for both the jardiance and glipizide. Orders are already sent to the pharmacy with refills.

## 2024-12-19 NOTE — ASSESSMENT & PLAN NOTE
Lab Results   Component Value Date    HGBA1C 12.3 (H) 11/08/2024     Orders:  •  glipiZIDE (GLUCOTROL XL) 5 mg 24 hr tablet; Take 1 tablet (5 mg total) by mouth daily  •  Empagliflozin (JARDIANCE) 10 MG TABS tablet; Take 1 tablet (10 mg total) by mouth daily  •  semaglutide, 0.25 or 0.5 mg/dose, (Ozempic, 0.25 or 0.5 MG/DOSE,) 2 mg/3 mL injection pen; Inject 0.75 mL (0.5 mg total) under the skin every 7 days  Patient states that the pharmacy only gave her 1 oral medication for diabetes instead of 2 as prescribed at last visit.  She is not sure which pill she is taking, the glipizide or the Jardiance. Per chart review it appears that jardiance was anever dispensed, but glipizide was. I have resent both medications and instructed the patient to speak to the pharmacy to ensure she gets both Jardiance and glipizide.  She tolerated the 0.25 mg dose of Ozempic, so we will plan to increase to 0.5 mg once weekly.  I instructed the patient to message me in a couple of weeks if she is tolerating the 0.5 mg dose, then I will send in for the next dose up. Recommend she follow up in the office in 2 months so that we can recheck her A1C.  Reach out sooner with any side effects medication or episodes of hypoglycemia.  Encouraged her to continue to take her blood sugars daily and keep a log of the readings.  Encouraged her to cut back on carbohydrate and sugar intake.

## 2025-01-09 ENCOUNTER — PROCEDURE VISIT (OUTPATIENT)
Age: 35
End: 2025-01-09
Payer: COMMERCIAL

## 2025-01-09 VITALS
WEIGHT: 176 LBS | HEIGHT: 57 IN | BODY MASS INDEX: 37.97 KG/M2 | SYSTOLIC BLOOD PRESSURE: 114 MMHG | DIASTOLIC BLOOD PRESSURE: 86 MMHG

## 2025-01-09 DIAGNOSIS — Z30.432 ENCOUNTER FOR IUD REMOVAL: ICD-10-CM

## 2025-01-09 PROBLEM — E66.09 CLASS 2 OBESITY DUE TO EXCESS CALORIES WITHOUT SERIOUS COMORBIDITY WITH BODY MASS INDEX (BMI) OF 36.0 TO 36.9 IN ADULT: Status: RESOLVED | Noted: 2019-04-01 | Resolved: 2025-01-09

## 2025-01-09 PROBLEM — E78.5 HYPERLIPIDEMIA ASSOCIATED WITH TYPE 2 DIABETES MELLITUS  (HCC): Status: ACTIVE | Noted: 2021-06-28

## 2025-01-09 PROBLEM — E11.69 HYPERLIPIDEMIA ASSOCIATED WITH TYPE 2 DIABETES MELLITUS  (HCC): Status: ACTIVE | Noted: 2021-06-28

## 2025-01-09 PROBLEM — E66.812 CLASS 2 OBESITY DUE TO EXCESS CALORIES WITHOUT SERIOUS COMORBIDITY WITH BODY MASS INDEX (BMI) OF 36.0 TO 36.9 IN ADULT: Status: RESOLVED | Noted: 2019-04-01 | Resolved: 2025-01-09

## 2025-01-09 PROBLEM — E55.9 VITAMIN D DEFICIENCY: Status: ACTIVE | Noted: 2021-06-28

## 2025-01-09 PROBLEM — F33.9 EPISODE OF RECURRENT MAJOR DEPRESSIVE DISORDER (HCC): Status: ACTIVE | Noted: 2021-01-18

## 2025-01-09 PROBLEM — E11.65 UNCONTROLLED TYPE 2 DIABETES MELLITUS WITH HYPERGLYCEMIA, WITHOUT LONG-TERM CURRENT USE OF INSULIN (HCC): Status: ACTIVE | Noted: 2022-01-11

## 2025-01-09 PROCEDURE — 99203 OFFICE O/P NEW LOW 30 MIN: CPT | Performed by: NURSE PRACTITIONER

## 2025-01-09 NOTE — PATIENT INSTRUCTIONS
"Patient Education     How to plan and prepare for a healthy pregnancy   The Basics   Written by the doctors and editors at [facility]   Should I see a doctor before I try to get pregnant? -- Yes. Before you start trying, it's important to see your doctor or nurse for a \"pre-pregnancy check-up.\" They will ask you about things that could affect your pregnancy. For example, they might ask about your diet, lifestyle, use of birth control, past pregnancies, and medicines. They might also ask about any health conditions you have or that run in your family.  There are several things you can do to make sure that your pregnancy is as healthy as possible. Do these things before you try to get pregnant, if possible:   Talk to your doctor or nurse about any medicines or herbal drugs you take. They can tell you if you need to make any changes.   Discuss whether you are up to date on your vaccines.   Start taking a multivitamin that has folic acid (also called folate).   Know which foods you should avoid and which foods are best.   Stop smoking, drinking alcohol, or taking drugs that are not prescribed to you by a doctor.   Understand the risks to you and your baby if:   You have any medical conditions.   There are diseases that run in your family or your partner's family.   Discuss whether there are any harmful substances in your home or work.   Try to keep a healthy weight.  Each of these issues is explained in more detail below.  Ask if your medicines are safe -- If you take any medicines, supplements, or herbal drugs, ask your doctor if it is safe to keep taking them while you are pregnant or trying to get pregnant. Some medicines take a long time to leave your body completely, so it's important to plan ahead. In some cases, your doctor or nurse will want you to switch to different medicines that are safer for the baby.  It can be harmful to stop taking some medicines suddenly. Your doctor or nurse might need to slowly reduce " "your dose. This is especially important for if you take certain medicines used to treat seizures, high blood pressure, lupus, or rheumatoid arthritis.  Check if you need any vaccines -- If you want to get pregnant, it's important to be up to date on your vaccines. This includes vaccines against measles, mumps, rubella, tetanus, diphtheria, polio, chickenpox (also called varicella), and possibly hepatitis. Many people got these vaccines as children. Still, it is important to check that you have gotten all of the vaccines you need. If not, you could get sick with the diseases the vaccines protect against, which could cause problems for you or your baby. It's also important to get the COVID-19 vaccine, as well as a flu shot every year.  Some vaccines cannot be given during pregnancy or in the month before pregnancy. It's important to get these vaccines more than a month before you start trying to get pregnant.  Start taking a multivitamin -- If you want to get pregnant, take a \"prenatal\" multivitamin every day. It should have at least 400 micrograms of folic acid. This helps prevent some of the problems a baby can be born with. Start taking the multivitamin at least a month before you start trying to get pregnant. That's because by the time you find out that you are pregnant, your baby has already formed many body parts that rely on folic acid and other vitamins to develop normally.  Do not to take too much of any vitamin during pregnancy, especially vitamin A. Show your doctor or nurse the vitamins you plan to take. They can make sure that the doses are safe for you and your baby.  Check your diet -- In general, try to eat a balanced diet with lots of fruits, vegetables, and whole grains.  Some foods are not safe to eat during pregnancy. If you are trying to get pregnant, these tips can help you stay safe:   Do not eat raw or undercooked meat. Avoid eating shark, swordfish, linda mackerel, or tilefish because they can " "have high levels of mercury. Check with your doctor or nurse about the safety of fish caught in local rivers and lakes.   Limit the amount of caffeine you have to no more than 1 or 2 cups of coffee each day. Tea and cola also contain caffeine, but usually not as much as coffee.   Avoid germs in your food that could make you sick. Wash your hands with soap and water before preparing or touching food (figure 1). Wash or peel fruits and vegetables before eating them. Pregnant people should also avoid certain foods that can carry germs. These include deli meats and milk, cheese, or juice that has not been pasteurized (also called \"unpasteurized\").  Stop smoking, drinking alcohol, and taking illegal drugs -- If you smoke, drink alcohol, or take drugs not prescribed for you by a doctor, it is very important that you stop, now more than ever. Even small amounts of these substances during pregnancy can harm a baby. This includes electronic cigarettes (\"vaping\") and marijuana.  It's not enough to stop as soon as you find out that you are pregnant. By then, the baby has already begun to form and could be harmed by smoking, alcohol, or drugs. If you need help quitting, talk with your doctor or nurse. There are treatments that can help.  Ask about risks -- Ask your doctor what the risks to you and your baby might be if:   You have any medical conditions - If you have a medical problem, it could cause problems for you or your baby during pregnancy. People who have certain medical conditions should work with their doctor to get their conditions under control before they get pregnant. This includes people with diabetes, high blood pressure, asthma, thyroid conditions, seizure disorders, HIV infection, and other problems. If you have one of these conditions and it is not well controlled, it can cause problems for both you and your baby during pregnancy.   You or your partner has a family history of a medical condition - If you or " your partner has a history of a condition that could be passed on to your baby, your doctor might recommend genetic counseling. Genetic counseling can help you understand the chances that your baby will also have the condition. A genetic counselor can also talk to you about your options if your baby does have a genetic problem.  Check your home and work for harmful substances -- People often have chemicals or substances in their home or work that could hurt an unborn baby. Dealing with these substances can sometimes be complicated and time consuming, so it's important to plan ahead. For instance, people who live in homes built before 1978 often have lead paint on their walls or woodwork. Lead in chips or dust from this paint could harm a baby. Ask your doctor or nurse how to deal with lead and other harmful substances that might be around you.  Try to keep a healthy weight -- Weighing too little or too much can make it harder to get pregnant and lead to problems during pregnancy. Try to reach a healthy weight before you try to get pregnant.  Is there anything my partner can do? -- Yes. Some things they can do:   They should stop smoking and using drugs, and limit alcohol. The doctor or nurse might ask questions about their health and medical history, too.   It can help for your partner to support you. For example, they can make healthy changes to their diet along with you.   When you are ready to start trying to get pregnant, your doctor or nurse can talk to you about how to increase your chances. For pregnancy to happen through sex, your partner's sperm needs to fertilize your egg (figure 2). Having sex at certain times during your monthly cycle can increase the chances of pregnancy. To make sure that your partner's sperm are as healthy as possible, it can also help to have sex every 2 or 3 days, and for them to avoid ejaculating in between these times.  If you do not have a male partner and are planning to get  "pregnant in another way (for example, with a sperm donor), it can still be helpful to have the support of your partner, family, or friends. If you do not feel like you have support, or if you are struggling with relationships or feeling unsafe in any way, talk with your doctor or nurse. They can help.  All topics are updated as new evidence becomes available and our peer review process is complete.  This topic retrieved from [employer] on: Feb 26, 2024.  Topic 18864 Version 13.0  Release: 32.2.4 - C32.56  © 2024 UpToDate, Inc. and/or its affiliates. All rights reserved.  figure 1: How to wash your hands     Wet your hands with clean water, and apply a small amount of soap. Lather and rub hands together for at least 20 seconds. Clean your wrists, palms, backs of your hands, between your fingers, tips of your fingers, thumbs, and under and around your nails. Rinse well, and dry your hands using a clean towel.  Graphic 065711 Version 7.0  figure 2: How pregnancy happens     When pregnancy happens through sex, the following steps must happen:  An egg is released from the ovary. This is called \"ovulation.\"  The partner's sperm swim up the vagina, into the uterus, and up the fallopian tubes. (These are the tubes that connect the ovaries to the uterus.)  When the sperm reach the egg, at least 1 sperm must get through the outer casing of the egg and make it inside. This is called \"fertilization.\"  The newly fertilized egg travels down to the uterus.  The egg secures itself to the wall of the uterus. This is called \"implantation.\"  Graphic 58458 Version 6.0  Consumer Information Use and Disclaimer   Disclaimer: This generalized information is a limited summary of diagnosis, treatment, and/or medication information. It is not meant to be comprehensive and should be used as a tool to help the user understand and/or assess potential diagnostic and treatment options. It does NOT include all information about conditions, " treatments, medications, side effects, or risks that may apply to a specific patient. It is not intended to be medical advice or a substitute for the medical advice, diagnosis, or treatment of a health care provider based on the health care provider's examination and assessment of a patient's specific and unique circumstances. Patients must speak with a health care provider for complete information about their health, medical questions, and treatment options, including any risks or benefits regarding use of medications. This information does not endorse any treatments or medications as safe, effective, or approved for treating a specific patient. UpToDate, Inc. and its affiliates disclaim any warranty or liability relating to this information or the use thereof.The use of this information is governed by the Terms of Use, available at https://www.woltersInstamouruwer.com/en/know/clinical-effectiveness-terms. 2024© UpToDate, Inc. and its affiliates and/or licensors. All rights reserved.  Copyright   © 2024 Up[employer]Date, Inc. and/or its affiliates. All rights reserved.

## 2025-01-09 NOTE — PROGRESS NOTES
Diagnoses and all orders for this visit:    Encounter for IUD removal  -     Ambulatory referral to Obstetrics / Gynecology    Call if no symptom improvement, all questions answered, return for annual in 6 mos.  Encouraged close follow-up with her PCP to help with her uncontrolled diabetes.  I encouraged diet and exercise.  We discussed diet changes.            Pleasant 34 y.o. NP female patient here for IUD removal.  She has a Mirena for the past 5 years.  She is amenorrheic on the Mirena.  She would like to pursue pregnancy.  She is an uncontrolled diabetic with a  hemoglobin A1c of 12.3.  I discussed that her pregnancy would be considered high risk because of the uncontrolled diabetes.  She initially wanted the IUD out but has changed her mind.  I did try to remove the IUD and strings were not visualized.  I was able to find the strings which were difficult to see due to redundant vaginal walls.  She asked that I leave her IUD in so I stopped.    Past Medical History:   Diagnosis Date    Diabetes mellitus (HCC)      History reviewed. No pertinent surgical history.  Social History     Tobacco Use    Smoking status: Never    Smokeless tobacco: Never   Vaping Use    Vaping status: Never Used   Substance Use Topics    Alcohol use: Never    Drug use: Never     History reviewed. No pertinent family history.    Current Outpatient Medications:     albuterol (Ventolin HFA) 90 mcg/act inhaler, Inhale 2 puffs every 6 (six) hours as needed for wheezing, Disp: 18 g, Rfl: 5    Blood Glucose Monitoring Suppl (OneTouch Verio Reflect) w/Device KIT, Check blood sugars three times daily. Please substitute with appropriate alternative as covered by patient's insurance. Dx: E11.65, Disp: 1 kit, Rfl: 0    Empagliflozin (JARDIANCE) 10 MG TABS tablet, Take 1 tablet (10 mg total) by mouth daily, Disp: 30 tablet, Rfl: 5    Fluticasone-Salmeterol (Advair Diskus) 250-50 mcg/dose inhaler, Inhale 1 puff 2 (two) times a day, Disp: 60  "blister, Rfl: 5    glipiZIDE (GLUCOTROL XL) 5 mg 24 hr tablet, Take 1 tablet (5 mg total) by mouth daily, Disp: 30 tablet, Rfl: 5    glucose blood (OneTouch Verio) test strip, Check blood sugars three times daily. Please substitute with appropriate alternative as covered by patient's insurance. Dx: E11.65, Disp: 300 each, Rfl: 3    Levonorgestrel (MIRENA) 20 MCG/DAY IUD, 1 each by Intrauterine route, Disp: , Rfl:     OneTouch Delica Lancets 33G MISC, Check blood sugars three times daily. Please substitute with appropriate alternative as covered by patient's insurance. Dx: E11.65, Disp: 300 each, Rfl: 3    semaglutide, 0.25 or 0.5 mg/dose, (Ozempic, 0.25 or 0.5 MG/DOSE,) 2 mg/3 mL injection pen, Inject 0.75 mL (0.5 mg total) under the skin every 7 days, Disp: 3 mL, Rfl: 0    No Known Allergies  OB History    Para Term  AB Living   4 4 4   4   SAB IAB Ectopic Multiple Live Births       4      # Outcome Date GA Lbr Julio Cesar/2nd Weight Sex Type Anes PTL Lv   4 Term 02/15/19 37w3d   M CS-LTranv   HONG   3 Term 18 39w5d  3175 g (7 lb) F CS-LTranv EPI N HONG   2 Term 16 39w0d  3175 g (7 lb) F CS-Unspec Spinal  HONG   1 Term 14 40w0d  3175 g (7 lb) F CS-Unspec   HONG      Birth Comments: emergency       Complications: White classification A1 gestational diabetes mellitus (GDM)     Children 6-11    Vitals:    25 1441   BP: 114/86   Weight: 79.8 kg (176 lb)   Height: 4' 9\" (1.448 m)     Body mass index is 38.09 kg/m².  No LMP recorded.    Review of Systems   Constitutional: Negative for chills, fatigue, fever and unexpected weight change.   Respiratory: Negative for shortness of breath.    Gastrointestinal: Negative for anal bleeding, blood in stool, constipation and diarrhea.   Genitourinary: Negative for difficulty urinating, dysuria and hematuria.     Physical Exam   Constitutional: She appears well-developed and well-nourished. No distress. Alert and oriented.  HENT: atraumatic, EOMI " bilaterally  Head: Normocephalic.   Neck: Normal range of motion. Neck supple.   Pulmonary: Effort normal.  Abdominal: Soft.   Pelvic exam was performed with patient supine. No labial fusion. There is no rash, tenderness, lesion or injury on the right labia. There is no rash, tenderness, lesion or injury on the left labia. Urethral meatus does not show any tenderness, inflammation or discharge. Palpation of midline bladder without pain or discomfort. Uterus is not deviated, not enlarged, not fixed and not tender. Cervix difficult to visualize OS. Strings seen . Right adnexum displays no mass, no tenderness and no fullness. Left adnexum displays no mass, no tenderness and no fullness. No erythema or tenderness in the vagina. No foreign body in the vagina. No signs of injury around the vagina. Vaginal discharge found. Perineum and anus without areas of injury. No lesions noted or swelling.  Lymphadenopathy:        Right: No inguinal adenopathy present.        Left: No inguinal adenopathy present.

## 2025-01-22 ENCOUNTER — TELEPHONE (OUTPATIENT)
Age: 35
End: 2025-01-22

## 2025-01-29 DIAGNOSIS — E11.9 TYPE 2 DIABETES MELLITUS WITHOUT COMPLICATION, WITHOUT LONG-TERM CURRENT USE OF INSULIN (HCC): ICD-10-CM

## 2025-01-29 DIAGNOSIS — J45.30 MILD PERSISTENT ASTHMA WITHOUT COMPLICATION: ICD-10-CM

## 2025-01-29 RX ORDER — GLIPIZIDE 5 MG/1
5 TABLET, FILM COATED, EXTENDED RELEASE ORAL DAILY
Qty: 90 TABLET | Refills: 1 | Status: SHIPPED | OUTPATIENT
Start: 2025-01-29

## 2025-01-29 RX ORDER — LANCETS 33 GAUGE
EACH MISCELLANEOUS
Qty: 300 EACH | Refills: 3 | Status: SHIPPED | OUTPATIENT
Start: 2025-01-29

## 2025-01-29 RX ORDER — BLOOD SUGAR DIAGNOSTIC
STRIP MISCELLANEOUS
Qty: 300 EACH | Refills: 3 | Status: SHIPPED | OUTPATIENT
Start: 2025-01-29

## 2025-01-29 RX ORDER — FLUTICASONE PROPIONATE AND SALMETEROL 250; 50 UG/1; UG/1
1 POWDER RESPIRATORY (INHALATION) 2 TIMES DAILY
Qty: 60 BLISTER | Refills: 5 | Status: SHIPPED | OUTPATIENT
Start: 2025-01-29 | End: 2025-02-04 | Stop reason: SDUPTHER

## 2025-01-29 RX ORDER — ALBUTEROL SULFATE 90 UG/1
2 INHALANT RESPIRATORY (INHALATION) EVERY 6 HOURS PRN
Qty: 18 G | Refills: 5 | Status: SHIPPED | OUTPATIENT
Start: 2025-01-29

## 2025-01-29 RX ORDER — BLOOD-GLUCOSE METER
KIT MISCELLANEOUS
Qty: 1 KIT | Refills: 0 | Status: SHIPPED | OUTPATIENT
Start: 2025-01-29

## 2025-01-29 NOTE — TELEPHONE ENCOUNTER
Lanie from [facility] called asking if all medications can be sent to the [facility] mail order with a 90 day supply. Patient will be using this pharmacy from now on

## 2025-02-04 DIAGNOSIS — J45.30 MILD PERSISTENT ASTHMA WITHOUT COMPLICATION: ICD-10-CM

## 2025-02-04 DIAGNOSIS — E11.9 TYPE 2 DIABETES MELLITUS WITHOUT COMPLICATION, WITHOUT LONG-TERM CURRENT USE OF INSULIN (HCC): ICD-10-CM

## 2025-02-04 RX ORDER — FLUTICASONE PROPIONATE AND SALMETEROL 250; 50 UG/1; UG/1
1 POWDER RESPIRATORY (INHALATION) 2 TIMES DAILY
Qty: 180 BLISTER | Refills: 1 | Status: SHIPPED | OUTPATIENT
Start: 2025-02-04

## 2025-02-04 NOTE — TELEPHONE ENCOUNTER
Not a duplicate- needs to be sent as 90 day for mail order service     Empagliflozin (JARDIANCE) 10 MG TABS tablet   90 day supply       Fluticasone-Salmeterol (Advair Diskus) 250-50 mcg/dose inhaler     90 day supply -  [facility]     Authorizing Provider:  [facility]

## 2025-02-24 DIAGNOSIS — E11.9 TYPE 2 DIABETES MELLITUS WITHOUT COMPLICATION, WITHOUT LONG-TERM CURRENT USE OF INSULIN (HCC): ICD-10-CM

## 2025-03-24 DIAGNOSIS — E11.9 TYPE 2 DIABETES MELLITUS WITHOUT COMPLICATION, WITHOUT LONG-TERM CURRENT USE OF INSULIN (HCC): ICD-10-CM

## 2025-03-26 RX ORDER — SEMAGLUTIDE 0.68 MG/ML
INJECTION, SOLUTION SUBCUTANEOUS
Qty: 3 ML | Refills: 0 | Status: SHIPPED | OUTPATIENT
Start: 2025-03-26

## 2025-04-04 ENCOUNTER — TELEPHONE (OUTPATIENT)
Age: 35
End: 2025-04-04

## 2025-04-04 NOTE — TELEPHONE ENCOUNTER
Marielena, from [facility], called in stating they will be faxing over paperwork for Dr. Ferguson to review in regards to pt.

## 2025-04-14 ENCOUNTER — OFFICE VISIT (OUTPATIENT)
Age: 35
End: 2025-04-14
Payer: COMMERCIAL

## 2025-04-14 ENCOUNTER — APPOINTMENT (OUTPATIENT)
Age: 35
End: 2025-04-14
Payer: COMMERCIAL

## 2025-04-14 VITALS
DIASTOLIC BLOOD PRESSURE: 70 MMHG | SYSTOLIC BLOOD PRESSURE: 112 MMHG | HEIGHT: 57 IN | TEMPERATURE: 95.1 F | HEART RATE: 101 BPM | OXYGEN SATURATION: 99 % | BODY MASS INDEX: 39.18 KG/M2 | WEIGHT: 181.6 LBS

## 2025-04-14 DIAGNOSIS — J45.30 MILD PERSISTENT ASTHMA WITHOUT COMPLICATION: ICD-10-CM

## 2025-04-14 DIAGNOSIS — E11.65 UNCONTROLLED TYPE 2 DIABETES MELLITUS WITH HYPERGLYCEMIA, WITHOUT LONG-TERM CURRENT USE OF INSULIN (HCC): Primary | ICD-10-CM

## 2025-04-14 DIAGNOSIS — E11.65 UNCONTROLLED TYPE 2 DIABETES MELLITUS WITH HYPERGLYCEMIA, WITHOUT LONG-TERM CURRENT USE OF INSULIN (HCC): ICD-10-CM

## 2025-04-14 PROCEDURE — 99214 OFFICE O/P EST MOD 30 MIN: CPT | Performed by: FAMILY MEDICINE

## 2025-04-14 PROCEDURE — 36415 COLL VENOUS BLD VENIPUNCTURE: CPT

## 2025-04-14 PROCEDURE — 83036 HEMOGLOBIN GLYCOSYLATED A1C: CPT

## 2025-04-14 RX ORDER — GLIPIZIDE 5 MG/1
10 TABLET, FILM COATED, EXTENDED RELEASE ORAL DAILY
Qty: 180 TABLET | Refills: 1 | Status: SHIPPED | OUTPATIENT
Start: 2025-04-14 | End: 2025-04-18

## 2025-04-14 RX ORDER — ACYCLOVIR 400 MG/1
1 TABLET ORAL
Qty: 9 EACH | Refills: 3 | Status: SHIPPED | OUTPATIENT
Start: 2025-04-14

## 2025-04-14 RX ORDER — ACYCLOVIR 400 MG/1
1 TABLET ORAL ONCE
Qty: 1 EACH | Refills: 0 | Status: SHIPPED | OUTPATIENT
Start: 2025-04-14 | End: 2025-04-14

## 2025-04-14 NOTE — ASSESSMENT & PLAN NOTE
Lab Results   Component Value Date    HGBA1C 12.3 (H) 11/08/2024       Orders:    glipiZIDE (GLUCOTROL XL) 5 mg 24 hr tablet; Take 2 tablets (10 mg total) by mouth daily

## 2025-04-14 NOTE — PROGRESS NOTES
"Name: Veronica Prescott      : 1990      MRN: [id number]  Encounter Provider: Mare Ferguson DO  Encounter Date: 2025   Encounter department: [facility] [place]  :  Assessment & Plan  Uncontrolled type 2 diabetes mellitus with hyperglycemia, without long-term current use of insulin (HCC)  Reports BG still in 200s. Will increase ozempic and glipizide. Pt to continue jardiance 10mg qd. CGM prescribed to help better monitor serum glucose   Lab Results   Component Value Date    HGBA1C 12.3 (H) 2024       Orders:    Continuous Glucose Sensor (Dexcom G7 Sensor); Use 1 Device every 10 days    Continuous Glucose  (Dexcom G7 ) KAYODE; Use 1 each 1 (one) time for 1 dose    glipiZIDE (GLUCOTROL XL) 5 mg 24 hr tablet; Take 2 tablets (10 mg total) by mouth daily    semaglutide, 1 mg/dose, (Ozempic) 4 mg/3 mL injection pen; Inject 0.75 mL (1 mg total) under the skin once a week    Hemoglobin A1C; Future    Mild persistent asthma without complication  Controlled with prn albuterol               History of Present Illness   Presents for DM2 f/u   Reports infredqunt BG checks. Still in the 200s.   Reprots medicaiton complience   Has been cuting regino on soda and juice       Review of Systems   Respiratory:  Negative for shortness of breath.    Cardiovascular:  Negative for chest pain.   Gastrointestinal:  Negative for constipation and diarrhea.   Neurological:  Negative for headaches.       Objective   /70 (Patient Position: Sitting, Cuff Size: Large)   Pulse 101   Temp (!) 95.1 °F (35.1 °C)   Ht 4' 9\" (1.448 m)   Wt 82.4 kg (181 lb 9.6 oz)   SpO2 99%   BMI 39.30 kg/m²      Physical Exam  HENT:      Head: Normocephalic.   Eyes:      Conjunctiva/sclera: Conjunctivae normal.   Cardiovascular:      Rate and Rhythm: Normal rate and regular rhythm.   Pulmonary:      Effort: Pulmonary effort is normal.      Breath sounds: Normal breath sounds.   Neurological:      Mental " Status: She is alert and oriented to person, place, and time.   Psychiatric:         Mood and Affect: Mood normal.         Behavior: Behavior normal.

## 2025-04-14 NOTE — ASSESSMENT & PLAN NOTE
Reports BG still in 200s. Will increase ozempic and glipizide. Pt to continue jardiance 10mg qd. CGM prescribed to help better monitor serum glucose   Lab Results   Component Value Date    HGBA1C 12.3 (H) 11/08/2024       Orders:    Continuous Glucose Sensor (Dexcom G7 Sensor); Use 1 Device every 10 days    Continuous Glucose  (Dexcom G7 ) KAYODE; Use 1 each 1 (one) time for 1 dose    glipiZIDE (GLUCOTROL XL) 5 mg 24 hr tablet; Take 2 tablets (10 mg total) by mouth daily    semaglutide, 1 mg/dose, (Ozempic) 4 mg/3 mL injection pen; Inject 0.75 mL (1 mg total) under the skin once a week    Hemoglobin A1C; Future

## 2025-04-15 ENCOUNTER — TELEPHONE (OUTPATIENT)
Dept: ADMINISTRATIVE | Facility: OTHER | Age: 35
End: 2025-04-15

## 2025-04-15 ENCOUNTER — RESULTS FOLLOW-UP (OUTPATIENT)
Age: 35
End: 2025-04-15

## 2025-04-15 LAB
EST. AVERAGE GLUCOSE BLD GHB EST-MCNC: 174 MG/DL
HBA1C MFR BLD: 7.7 %

## 2025-04-15 NOTE — TELEPHONE ENCOUNTER
----- Message from Jeanette RUFF sent at 4/14/2025  1:28 PM EDT -----  04/14/25 1:28 PM    Hello, our patient Veronica Prescott has had Pap Smear (HPV) aka Cervical Cancer Screening completed/performed. Please assist in updating the patient chart by pulling a previous Electronic Medical Record (EMR) document. The previous EMR is labs. The date of service is 4/22/2024.    Thank you,  Jeanette Spicer MA   [facility] [place]

## 2025-04-15 NOTE — TELEPHONE ENCOUNTER
----- Message from Mare Ferguson DO sent at 4/15/2025 10:19 AM EDT -----  Please notify patient that her A1c is 7.7.  This is much better than what it was 5 months ago.  If she continues to take the medication at the new doses that I prescribed during her visit she should be able to get her A1c to less than 7 which is the goal.  Again continue to take medications as prescribed her sugars are improving

## 2025-04-15 NOTE — TELEPHONE ENCOUNTER
Upon review of the In Basket request we were able to locate, review, and update the patient chart as requested for Pap Smear (HPV) aka Cervical Cancer Screening.    Any additional questions or concerns should be emailed to the Practice Liaisons via the appropriate education email address, please do not reply via In Basket.    Thank you  Stacie Hurley MA   [facility]

## 2025-04-18 DIAGNOSIS — E11.65 UNCONTROLLED TYPE 2 DIABETES MELLITUS WITH HYPERGLYCEMIA, WITHOUT LONG-TERM CURRENT USE OF INSULIN (HCC): ICD-10-CM

## 2025-04-18 RX ORDER — GLIPIZIDE 10 MG/1
10 TABLET, FILM COATED, EXTENDED RELEASE ORAL DAILY
Qty: 30 TABLET | Refills: 3 | Status: SHIPPED | OUTPATIENT
Start: 2025-04-18

## 2025-05-12 ENCOUNTER — VBI (OUTPATIENT)
Dept: ADMINISTRATIVE | Facility: OTHER | Age: 35
End: 2025-05-12

## 2025-05-12 NOTE — TELEPHONE ENCOUNTER
05/12/25 9:05 AM     Chart reviewed for Diabetic Eye Exam was/were submitted to the patient's insurance.     Daina Acosta MA   [facility]

## 2025-07-28 DIAGNOSIS — E11.9 TYPE 2 DIABETES MELLITUS WITHOUT COMPLICATION, WITHOUT LONG-TERM CURRENT USE OF INSULIN (HCC): ICD-10-CM

## 2025-07-28 DIAGNOSIS — J45.30 MILD PERSISTENT ASTHMA WITHOUT COMPLICATION: ICD-10-CM

## 2025-07-28 RX ORDER — EMPAGLIFLOZIN 10 MG/1
10 TABLET, FILM COATED ORAL DAILY
Qty: 90 TABLET | Refills: 1 | Status: SHIPPED | OUTPATIENT
Start: 2025-07-28

## 2025-07-28 RX ORDER — FLUTICASONE PROPIONATE AND SALMETEROL 250; 50 UG/1; UG/1
1 POWDER RESPIRATORY (INHALATION) 2 TIMES DAILY
Qty: 180 BLISTER | Refills: 1 | Status: SHIPPED | OUTPATIENT
Start: 2025-07-28

## 2025-08-01 ENCOUNTER — VBI (OUTPATIENT)
Dept: ADMINISTRATIVE | Facility: OTHER | Age: 35
End: 2025-08-01

## 2025-08-06 ENCOUNTER — TELEPHONE (OUTPATIENT)
Age: 35
End: 2025-08-06

## 2025-08-06 DIAGNOSIS — Z11.1 SCREENING-PULMONARY TB: Primary | ICD-10-CM

## 2025-08-07 ENCOUNTER — APPOINTMENT (OUTPATIENT)
Age: 35
End: 2025-08-07
Payer: COMMERCIAL

## 2025-08-20 DIAGNOSIS — E11.65 UNCONTROLLED TYPE 2 DIABETES MELLITUS WITH HYPERGLYCEMIA, WITHOUT LONG-TERM CURRENT USE OF INSULIN (HCC): ICD-10-CM

## 2025-08-20 DIAGNOSIS — E11.9 TYPE 2 DIABETES MELLITUS WITHOUT COMPLICATION, WITHOUT LONG-TERM CURRENT USE OF INSULIN (HCC): Primary | ICD-10-CM

## 2025-08-20 RX ORDER — LANCETS
EACH MISCELLANEOUS
Qty: 300 EACH | Refills: 1 | Status: SHIPPED | OUTPATIENT
Start: 2025-08-20

## 2025-08-20 RX ORDER — BLOOD-GLUCOSE METER
EACH MISCELLANEOUS 3 TIMES DAILY
Qty: 1 KIT | Refills: 0 | Status: SHIPPED | OUTPATIENT
Start: 2025-08-20

## 2025-08-20 RX ORDER — BLOOD SUGAR DIAGNOSTIC
STRIP MISCELLANEOUS
Qty: 300 STRIP | Refills: 1 | Status: SHIPPED | OUTPATIENT
Start: 2025-08-20

## 2025-08-21 ENCOUNTER — TELEPHONE (OUTPATIENT)
Age: 35
End: 2025-08-21